# Patient Record
Sex: FEMALE | Race: WHITE | NOT HISPANIC OR LATINO | URBAN - METROPOLITAN AREA
[De-identification: names, ages, dates, MRNs, and addresses within clinical notes are randomized per-mention and may not be internally consistent; named-entity substitution may affect disease eponyms.]

---

## 2020-12-31 ENCOUNTER — EMERGENCY (EMERGENCY)
Facility: HOSPITAL | Age: 20
LOS: 1 days | Discharge: HOME | End: 2020-12-31
Attending: EMERGENCY MEDICINE | Admitting: EMERGENCY MEDICINE
Payer: OTHER MISCELLANEOUS

## 2020-12-31 VITALS
HEIGHT: 68 IN | WEIGHT: 115.08 LBS | RESPIRATION RATE: 18 BRPM | HEART RATE: 78 BPM | DIASTOLIC BLOOD PRESSURE: 80 MMHG | TEMPERATURE: 98 F | SYSTOLIC BLOOD PRESSURE: 120 MMHG | OXYGEN SATURATION: 98 %
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
## 2020-12-31 DIAGNOSIS — S09.90XA UNSPECIFIED INJURY OF HEAD, INITIAL ENCOUNTER: ICD-10-CM
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
## 2020-12-31 PROCEDURE — 99284 EMERGENCY DEPT VISIT MOD MDM: CPT | Mod: 25

## 2020-12-31 PROCEDURE — 70450 CT HEAD/BRAIN W/O DYE: CPT | Mod: 26

## 2020-12-31 PROCEDURE — 99284 EMERGENCY DEPT VISIT MOD MDM: CPT

## 2020-12-31 PROCEDURE — 70450 CT HEAD/BRAIN W/O DYE: CPT

## 2020-12-31 RX ORDER — IBUPROFEN 200 MG
600 TABLET ORAL ONCE
Refills: 0 | Status: COMPLETED | OUTPATIENT
Start: 2020-12-31 | End: 2020-12-31

## 2020-12-31 RX ADMIN — Medication 600 MILLIGRAM(S): at 10:57

## 2020-12-31 NOTE — ED ADULT NURSE NOTE - OBJECTIVE STATEMENT
Pt presents reporting she struck her head on a microscope 1 hr pta. Pt reports hx of recent concussions, last in 9/2020. Pt reports frontal headache.

## 2020-12-31 NOTE — ED PROVIDER NOTE - CLINICAL SUMMARY MEDICAL DECISION MAKING FREE TEXT BOX
Impression: h/o head injury and post concussive syndrome, here w/ L sided head injury today w/ no associated LOC. Pt afebrile and HDS. Not on any blood thinners. Neuro exam is nonfocal. Plan for CT head to r/o acute intracranial bleeding; if negative will treat w/ toradol. Impression: h/o head injury and post concussive syndrome, here w/ L sided head injury today w/ no associated LOC. Pt afebrile and HDS. Not on any blood thinners. Neuro exam is nonfocal. Plan for CT head to r/o acute intracranial bleeding; if negative will treat w/ toradol.    CT head neg for acute injury; ? vascular malformation/ underlying mass to r frontal white matter. ED evaluation and management discussed with the patient in detail.  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 Close neuro and PMD follow up encouraged.  Strict ED return instructions discussed in detail and patient given the opportunity to ask any questions about their discharge diagnosis and instructions. Patient verbalized understanding.

## 2020-12-31 NOTE — ED ADULT NURSE NOTE - OTHER COMPLAINTS
biba from [facility] c/o headache, dizziness and nausea s/p hitting left side head on microscope.   No loc, no blood thinners.

## 2020-12-31 NOTE — ED PROVIDER NOTE - CHPI ED SYMPTOMS NEG
no vision changes, no speech difficulties, no neck pain, no back pain/no loss of consciousness/no vomiting/no weakness

## 2020-12-31 NOTE — ED PROVIDER NOTE - OBJECTIVE STATEMENT
21 y/o F with PMHx prior head injury and post concussive syndrome, most recently in 9/2020, presents to the ED s/p head injury sustained today. 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 Denies LOC. States the onset of dizziness started approximately 20 minutes later, described as feeling unsteady on her feet, w/ associated nausea but no vomiting. The nausea has since resolved, however the dizziness remains, as well as a 6/10 frontal headache and some tingling to her L hand. Pt otherwise denies any other symptoms; denies any acute vision changes, speech difficulties, focal numbness or weakness, neck or back pain. States she was in her usual state of health prior to today. 21 y/o F with PMHx prior head injury and post concussive syndrome, most recently in 9/2020, presents to the ED s/p head injury sustained today. 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 Denies LOC. States the onset of dizziness started approximately 20 minutes later, described as feeling unsteady on her feet, w/ associated nausea but no vomiting. The nausea has since resolved, however the dizziness remains, as well as a 6/10 frontal headache and some tingling to her L hand. Pt otherwise denies any other symptoms; denies any acute vision changes, speech difficulties, focal numbness or weakness, neck or back pain. States she was in her usual state of health prior to today. Not on blood thinners.

## 2020-12-31 NOTE — ED ADULT TRIAGE NOTE - OTHER COMPLAINTS
biba from [facility] c/o headache, dizziness and nausea s/p hitting left side head on microscope.   No loc, no blood thinners.

## 2020-12-31 NOTE — ED PROVIDER NOTE - PHYSICAL EXAMINATION
VITAL SIGNS: I have reviewed nursing notes and confirm.  CONSTITUTIONAL: Well-developed; well-nourished; in no acute distress.   SKIN:  warm and dry, no acute rash.   HEAD:  normocephalic, atraumatic.  EYES: PERRL, EOM intact; conjunctiva and sclera clear.  ENT: No nasal discharge; airway clear.   NECK: Supple; non tender.  CARD: S1, S2 normal; no murmurs, gallops, or rubs. Regular rate and rhythm.   RESP:  Clear to auscultation b/l, no wheezes, rales or rhonchi.  ABD: Normal bowel sounds; soft; non-distended; non-tender; no guarding/ rebound.  EXT: Normal ROM. No clubbing, cyanosis or edema. 2+ pulses to b/l ue/le.  NEURO: Alert & oriented x3, CN II-XII intact, motor function and sensation intact and equal b/l, negative pronator drift, finger to nose, heel to shin, and rapid alternating movements intact, normal gait, no ataxia.  PSYCH: Cooperative, mood and affect appropriate.

## 2020-12-31 NOTE — ED PROVIDER NOTE - NSFOLLOWUPINSTRUCTIONS_ED_ALL_ED_FT
Take ibuprofen as needed for headache.   Follow up with your neurologist for re-evaluation and further work up as indicated.  Return to er for any new or worsening symptoms (severe headache, acute vision changes, focal numbness, weakness, nausea, vomiting...)                 Log Out.      [email or web address] CareNotes®     :  [facility]  	                       HEAD INJURY - AfterCare(R) Instructions(ER/ED)           Head Injury    WHAT YOU NEED TO KNOW:    A head injury can include your scalp, face, skull, or brain and range from mild to severe. Effects can appear immediately after the injury or develop later. The effects may last a short time or be permanent. Healthcare providers may want to check your recovery over time. Treatment may change as you recover or develop new health problems from the head injury.    DISCHARGE INSTRUCTIONS:    Call your local emergency number (911 in the [place]), or have someone else call if:   •You cannot be woken.      •You have a seizure.      •You stop responding to others or you faint.      •You have blurry or double vision.      •Your speech becomes slurred or confused.      •You have arm or leg weakness, loss of feeling, or new problems with coordination.      •Your pupils are larger than usual, or one pupil is a different size than the other.      •You have blood or clear fluid coming out of your ears or nose.      Return to the emergency department if:   •You have repeated or forceful vomiting.      •You feel confused.      •Your headache gets worse or becomes severe.      •You or someone caring for you notices that you are harder to wake than usual.      Call your doctor if:   •Your symptoms last longer than 6 weeks after the injury.      •You have questions or concerns about your condition or care.      Medicines:   •Acetaminophen decreases pain and fever. It is available without a doctor's order. Ask how much to take and how often to take it. Follow directions. Read the labels of all other medicines you are using to see if they also contain acetaminophen, or ask your doctor or pharmacist. Acetaminophen can cause liver damage if not taken correctly. Do not use more than 4 grams (4,000 milligrams) total of acetaminophen in one day.       •Take your medicine as directed. Contact your healthcare provider if you think your medicine is not helping or if you have side effects. Tell him or her if you are allergic to any medicine. Keep a list of the medicines, vitamins, and herbs you take. Include the amounts, and when and why you take them. Bring the list or the pill bottles to follow-up visits. Carry your medicine list with you in case of an emergency.      Self-care:   •Rest or do quiet activities. Limit your time watching TV, using the computer, or doing tasks that require a lot of thinking. Slowly return to your normal activities as directed. Do not play sports or do activities that may cause you to get hit in the head. Ask your healthcare provider when you can return to sports.      •Apply ice on your head for 15 to 20 minutes every hour or as directed. Use an ice pack, or put crushed ice in a plastic bag. Cover it with a towel before you apply it to your skin. Ice helps prevent tissue damage and decreases swelling and pain.      •Have someone stay with you for 24 hours , or as directed. This person can monitor you for problems and call for help if needed. When you are awake, the person should ask you a few questions every few hours to see if you are thinking clearly. An example is to ask your name or address.      Prevent another head injury:   •Wear a helmet that fits properly. Do this when you play sports, or ride a bike, scooter, or skateboard. Helmets help decrease your risk for a serious head injury. Talk to your healthcare provider about other ways you can protect yourself if you play sports.      •Wear your seatbelt every time you are in a car. This helps lower your risk for a head injury if you are in a car accident.      Follow up with your doctor as directed: Write down your questions so you remember to ask them during your visits.       © Copyright [employer] 2020           back to top                          © Copyright [employer] 2020

## 2020-12-31 NOTE — ED ADULT NURSE NOTE - NSIMPLEMENTINTERV_GEN_ALL_ED
Implemented All Universal Safety Interventions:  [place] to call system. Call bell, personal items and telephone within reach. Instruct patient to call for assistance. Room bathroom lighting operational. Non-slip footwear when patient is off stretcher. Physically safe environment: no spills, clutter or unnecessary equipment. Stretcher in lowest position, wheels locked, appropriate side rails in place.

## 2020-12-31 NOTE — ED PROVIDER NOTE - PATIENT PORTAL LINK FT
You can access the FollowMyHealth Patient Portal offered by [facility] by registering at the following website: [email or web address]. By joining [employer]’s FollowMyHealth portal, you will also be able to view your health information using other applications (apps) compatible with our system.

## 2021-01-21 ENCOUNTER — EMERGENCY (EMERGENCY)
Facility: HOSPITAL | Age: 21
LOS: 1 days | Discharge: HOME | End: 2021-01-21
Admitting: EMERGENCY MEDICINE
Payer: COMMERCIAL

## 2021-01-21 VITALS
HEIGHT: 68 IN | HEART RATE: 85 BPM | OXYGEN SATURATION: 98 % | DIASTOLIC BLOOD PRESSURE: 86 MMHG | TEMPERATURE: 99 F | SYSTOLIC BLOOD PRESSURE: 124 MMHG | RESPIRATION RATE: 16 BRPM

## 2021-01-21 DIAGNOSIS — Z20.822 CONTACT WITH AND (SUSPECTED) EXPOSURE TO COVID-19: ICD-10-CM

## 2021-01-21 PROCEDURE — 99283 EMERGENCY DEPT VISIT LOW MDM: CPT

## 2021-01-21 PROCEDURE — U0003: CPT

## 2021-01-21 PROCEDURE — U0005: CPT

## 2021-01-21 NOTE — ED PROVIDER NOTE - CLINICAL SUMMARY MEDICAL DECISION MAKING FREE TEXT BOX
Patient is presenting to the Emergency Department and requesting a COVID-19 test.  Patient has minimal symptoms and is hemodynamically stable, has an unremarkable focused exam and looks well.  Nasopharyngeal PCR has been obtained and patient has been guided on how to obtain their results.  General COVID-19 discharge instructions have been given to the patient.

## 2021-01-21 NOTE — ED PROVIDER NOTE - NS ED ROS FT
CONSTITUTIONAL:  No fever or chills, +bodyaches  HEENT:  +ore throat or headache, no nasal congestion  PULM:  No cough or shortness of breath  GI:  +diarrhea, no vomiting

## 2021-01-21 NOTE — ED PROVIDER NOTE - OBJECTIVE STATEMENT
Patient is presenting to the Emergency Department with a request to have COVID-19 testing. She reports diarrhea, chest tightness, body aches, and sore throat since yesterday.  Denies symptoms, including cough, fever, chills, vomiting.

## 2021-01-21 NOTE — ED PROVIDER NOTE - PATIENT PORTAL LINK FT
You can access the FollowMyHealth Patient Portal offered by [facility] by registering at the following website: [email or web address]. By joining [employer]’s FollowMyHealth portal, you will also be able to view your health information using other applications (apps) compatible with our system.

## 2021-01-22 PROBLEM — F07.81 POSTCONCUSSIONAL SYNDROME: Chronic | Status: ACTIVE | Noted: 2020-12-31

## 2021-01-22 LAB — SARS-COV-2 RNA SPEC QL NAA+PROBE: DETECTED

## 2021-02-25 ENCOUNTER — EMERGENCY (EMERGENCY)
Facility: HOSPITAL | Age: 21
LOS: 1 days | Discharge: HOME | End: 2021-02-25
Attending: EMERGENCY MEDICINE | Admitting: EMERGENCY MEDICINE
Payer: COMMERCIAL

## 2021-02-25 VITALS
OXYGEN SATURATION: 98 % | DIASTOLIC BLOOD PRESSURE: 75 MMHG | SYSTOLIC BLOOD PRESSURE: 120 MMHG | TEMPERATURE: 98 F | HEART RATE: 75 BPM | RESPIRATION RATE: 16 BRPM

## 2021-02-25 VITALS
WEIGHT: 113.1 LBS | TEMPERATURE: 99 F | OXYGEN SATURATION: 100 % | SYSTOLIC BLOOD PRESSURE: 119 MMHG | DIASTOLIC BLOOD PRESSURE: 77 MMHG | RESPIRATION RATE: 18 BRPM | HEART RATE: 78 BPM | HEIGHT: 68 IN

## 2021-02-25 DIAGNOSIS — F07.81 POSTCONCUSSIONAL SYNDROME: ICD-10-CM

## 2021-02-25 DIAGNOSIS — R51.9 HEADACHE, UNSPECIFIED: ICD-10-CM

## 2021-02-25 LAB
ALBUMIN SERPL ELPH-MCNC: 4.5 G/DL — SIGNIFICANT CHANGE UP (ref 3.3–5)
ALP SERPL-CCNC: 50 U/L — SIGNIFICANT CHANGE UP (ref 40–120)
ALT FLD-CCNC: 8 U/L — LOW (ref 10–45)
ANION GAP SERPL CALC-SCNC: 10 MMOL/L — SIGNIFICANT CHANGE UP (ref 5–17)
AST SERPL-CCNC: 16 U/L — SIGNIFICANT CHANGE UP (ref 10–40)
BASOPHILS # BLD AUTO: 0.05 K/UL — SIGNIFICANT CHANGE UP (ref 0–0.2)
BASOPHILS NFR BLD AUTO: 0.8 % — SIGNIFICANT CHANGE UP (ref 0–2)
BILIRUB SERPL-MCNC: 0.2 MG/DL — SIGNIFICANT CHANGE UP (ref 0.2–1.2)
BUN SERPL-MCNC: 12 MG/DL — SIGNIFICANT CHANGE UP (ref 7–23)
CALCIUM SERPL-MCNC: 9.2 MG/DL — SIGNIFICANT CHANGE UP (ref 8.4–10.5)
CHLORIDE SERPL-SCNC: 105 MMOL/L — SIGNIFICANT CHANGE UP (ref 96–108)
CO2 SERPL-SCNC: 26 MMOL/L — SIGNIFICANT CHANGE UP (ref 22–31)
CREAT SERPL-MCNC: 0.61 MG/DL — SIGNIFICANT CHANGE UP (ref 0.5–1.3)
EOSINOPHIL # BLD AUTO: 0.08 K/UL — SIGNIFICANT CHANGE UP (ref 0–0.5)
EOSINOPHIL NFR BLD AUTO: 1.3 % — SIGNIFICANT CHANGE UP (ref 0–6)
GLUCOSE SERPL-MCNC: 92 MG/DL — SIGNIFICANT CHANGE UP (ref 70–99)
HCG SERPL-ACNC: <0 MIU/ML — SIGNIFICANT CHANGE UP
HCT VFR BLD CALC: 37.5 % — SIGNIFICANT CHANGE UP (ref 34.5–45)
HGB BLD-MCNC: 12.9 G/DL — SIGNIFICANT CHANGE UP (ref 11.5–15.5)
IMM GRANULOCYTES NFR BLD AUTO: 0.3 % — SIGNIFICANT CHANGE UP (ref 0–1.5)
LYMPHOCYTES # BLD AUTO: 2.72 K/UL — SIGNIFICANT CHANGE UP (ref 1–3.3)
LYMPHOCYTES # BLD AUTO: 45.4 % — HIGH (ref 13–44)
MCHC RBC-ENTMCNC: 30.1 PG — SIGNIFICANT CHANGE UP (ref 27–34)
MCHC RBC-ENTMCNC: 34.4 GM/DL — SIGNIFICANT CHANGE UP (ref 32–36)
MCV RBC AUTO: 87.4 FL — SIGNIFICANT CHANGE UP (ref 80–100)
MONOCYTES # BLD AUTO: 0.47 K/UL — SIGNIFICANT CHANGE UP (ref 0–0.9)
MONOCYTES NFR BLD AUTO: 7.8 % — SIGNIFICANT CHANGE UP (ref 2–14)
NEUTROPHILS # BLD AUTO: 2.65 K/UL — SIGNIFICANT CHANGE UP (ref 1.8–7.4)
NEUTROPHILS NFR BLD AUTO: 44.4 % — SIGNIFICANT CHANGE UP (ref 43–77)
NRBC # BLD: 0 /100 WBCS — SIGNIFICANT CHANGE UP (ref 0–0)
PLATELET # BLD AUTO: 189 K/UL — SIGNIFICANT CHANGE UP (ref 150–400)
POTASSIUM SERPL-MCNC: 3.9 MMOL/L — SIGNIFICANT CHANGE UP (ref 3.5–5.3)
POTASSIUM SERPL-SCNC: 3.9 MMOL/L — SIGNIFICANT CHANGE UP (ref 3.5–5.3)
PROT SERPL-MCNC: 7.2 G/DL — SIGNIFICANT CHANGE UP (ref 6–8.3)
RBC # BLD: 4.29 M/UL — SIGNIFICANT CHANGE UP (ref 3.8–5.2)
RBC # FLD: 11.7 % — SIGNIFICANT CHANGE UP (ref 10.3–14.5)
SODIUM SERPL-SCNC: 141 MMOL/L — SIGNIFICANT CHANGE UP (ref 135–145)
WBC # BLD: 5.99 K/UL — SIGNIFICANT CHANGE UP (ref 3.8–10.5)
WBC # FLD AUTO: 5.99 K/UL — SIGNIFICANT CHANGE UP (ref 3.8–10.5)

## 2021-02-25 PROCEDURE — 96374 THER/PROPH/DIAG INJ IV PUSH: CPT

## 2021-02-25 PROCEDURE — 99285 EMERGENCY DEPT VISIT HI MDM: CPT

## 2021-02-25 PROCEDURE — 80053 COMPREHEN METABOLIC PANEL: CPT

## 2021-02-25 PROCEDURE — 84702 CHORIONIC GONADOTROPIN TEST: CPT

## 2021-02-25 PROCEDURE — 99284 EMERGENCY DEPT VISIT MOD MDM: CPT | Mod: 25

## 2021-02-25 PROCEDURE — 82962 GLUCOSE BLOOD TEST: CPT

## 2021-02-25 PROCEDURE — 36415 COLL VENOUS BLD VENIPUNCTURE: CPT

## 2021-02-25 PROCEDURE — 85025 COMPLETE CBC W/AUTO DIFF WBC: CPT

## 2021-02-25 PROCEDURE — 96375 TX/PRO/DX INJ NEW DRUG ADDON: CPT

## 2021-02-25 PROCEDURE — 70450 CT HEAD/BRAIN W/O DYE: CPT

## 2021-02-25 PROCEDURE — 70450 CT HEAD/BRAIN W/O DYE: CPT | Mod: 26,MA

## 2021-02-25 RX ORDER — SODIUM CHLORIDE 9 MG/ML
1000 INJECTION INTRAMUSCULAR; INTRAVENOUS; SUBCUTANEOUS ONCE
Refills: 0 | Status: COMPLETED | OUTPATIENT
Start: 2021-02-25 | End: 2021-02-25

## 2021-02-25 RX ORDER — METOCLOPRAMIDE HCL 10 MG
10 TABLET ORAL ONCE
Refills: 0 | Status: COMPLETED | OUTPATIENT
Start: 2021-02-25 | End: 2021-02-25

## 2021-02-25 RX ORDER — KETOROLAC TROMETHAMINE 30 MG/ML
30 SYRINGE (ML) INJECTION ONCE
Refills: 0 | Status: DISCONTINUED | OUTPATIENT
Start: 2021-02-25 | End: 2021-02-25

## 2021-02-25 RX ADMIN — Medication 30 MILLIGRAM(S): at 17:27

## 2021-02-25 RX ADMIN — Medication 10 MILLIGRAM(S): at 17:26

## 2021-02-25 RX ADMIN — SODIUM CHLORIDE 1000 MILLILITER(S): 9 INJECTION INTRAMUSCULAR; INTRAVENOUS; SUBCUTANEOUS at 17:27

## 2021-02-25 NOTE — ED PROVIDER NOTE - PHYSICAL EXAMINATION
GEN: Well appearing, well nourished, awake, alert, oriented to person, place, time/situation and in no apparent distress. NTAF  ENT: Airway patent, Nasal mucosa clear. Mouth with normal mucosa.  EYES: Clear bilaterally. PERRL, EOMI  RESPIRATORY: Breathing comfortably with normal RR. No W/C/R, no hypoxia or resp distress.  CARDIAC: Regular rate and rhythm, no M/R/G  ABDOMEN: Soft, nontender, +bowel sounds, no rebound, rigidity, or guarding.  MSK: Range of motion is not limited, no deformities noted.  NEURO: Alert and oriented x 3. Cn 2-12 intact. Strength 5/5 and sensation intact in all 4 extremities.  Gait normal.   SKIN: Skin normal color for race, warm, dry and intact. No evidence of rash.  PSYCH: Alert and oriented to person, place, time/situation. normal mood and affect. no apparent risk to self or others.

## 2021-02-25 NOTE — ED PROVIDER NOTE - PATIENT PORTAL LINK FT
You can access the FollowMyHealth Patient Portal offered by [facility] by registering at the following website: [email or web address]. By joining [employer]’s FollowMyHealth portal, you will also be able to view your health information using other applications (apps) compatible with our system.

## 2021-02-25 NOTE — ED ADULT NURSE NOTE - OBJECTIVE STATEMENT
20 y.o F a&ox4 walked in from triage c.o headache. around 7 am today pt hit her head on a microscope while at work. Pt reports headache, blurred vision, nausea. PT states " I have post concussion syndrome this happened when I had a concussion last year." denies numbness, tingling, weakness. ambulates with steady gait. upgrade to MD szymanski. no blood thinners.

## 2021-02-25 NOTE — ED PROVIDER NOTE - NSFOLLOWUPINSTRUCTIONS_ED_ALL_ED_FT
Please follow up with your primary care physician and neurologist. If you have any problem getting an appointment this week, please call the ED Referral Coordinator at [phone].  Return to the Emergency Department if you have any new or worsening symptoms, or for any other concerns. Please read below for further information.      Post Concussion Syndrome    WHAT YOU NEED TO KNOW:    Post-concussion syndrome (PCS) is a group of symptoms that affect your nerves, thinking, and behavior. PCS develops shortly after a concussion and can last for weeks to months.    DISCHARGE INSTRUCTIONS:    Have someone call 911 for any of the following:   •You have a seizure.      •You have trouble breathing.      •You are not responding or you cannot be woken.      Return to the emergency department if:   •You have a sudden headache that seems different or much worse than your usual headaches.      •You cannot stop vomiting.      •You have sudden changes in your vision.      Contact your healthcare provider if:   •You have nausea or are vomiting.      •You have trouble concentrating.      •You have difficulty speaking or thinking.      •Your symptoms get worse.      •You have questions or concerns about your condition or care.      Medicines: You may need any of the following:   •Acetaminophen decreases pain and fever. It is available without a doctor's order. Ask how much to take and how often to take it. Follow directions. Read the labels of all other medicines you are using to see if they also contain acetaminophen, or ask your doctor or pharmacist. Acetaminophen can cause liver damage if not taken correctly. Do not use more than 4 grams (4,000 milligrams) total of acetaminophen in one day.       •NSAIDs help decrease swelling and pain or fever. This medicine is available with or without a doctor's order. NSAIDs can cause stomach bleeding or kidney problems in certain people. If you take blood thinner medicine, always ask your healthcare provider if NSAIDs are safe for you. Always read the medicine label and follow directions.      •Antidepressants may be given for depression or sleep problems.      •Migraine medicines may be given for migraine headaches.       Prevent PCS:   •Make your home safe. Home safety measures can help prevent head injuries that could lead to a concussion. Install handrails for every staircase. Put soft bumpers on furniture edges and corners. Secure furniture, such as dressers and book cases so they do not fall over.      •Always wear a seatbelt in the car. This helps decrease your risk for a head injury if you are in a car accident.      •Wear protective sports equipment that fits properly. Helmets help decrease your risk for a serious brain injury. Talk to your healthcare provider about other ways that you can decrease your risk for a concussion if you play sports. Ask for more information about sports concussions.      Manage your symptoms:   •Rest from physical and mental activities as directed. Mental activities need you to think, concentrate, and pay attention. Rest will help you recover from your concussion. Ask your healthcare provider when you can return to school and other daily activities.      •Go to therapy as directed. A cognitive behavioral therapist teaches you skills to help with any thinking and behavior problems you may have. An occupational therapist teaches your skills to help with daily activities.      •Do not participate in sports or physical activities until your healthcare provider says it is okay.These activities could make your symptoms worse or lead to another concussion. Your healthcare provider will tell you when it is okay to return to sports or physical activities.      Follow up with your healthcare provider as directed: Your healthcare provider may refer you to a psychiatrist, a neurologist, or a substance abuse counselor. Write down your questions so you remember to ask them during your visits.      Headache    A headache is pain or discomfort felt around the head or neck area. The specific cause of a headache may not be found as there are many types including tension headaches, migraine headaches, and cluster headaches. Watch your condition for any changes. Things you can do to manage your pain include taking over the counter and prescription medications as instructed by your health care provider, lying down in a dark quiet room, limiting stress, getting regular sleep, and refraining from alcohol and tobacco products.    SEEK IMMEDIATE MEDICAL CARE IF YOU HAVE ANY OF THE FOLLOWING SYMPTOMS: fever, vomiting, stiff neck, loss of vision, problems with speech, muscle weakness, loss of balance, trouble walking, passing out, or confusion.

## 2021-02-25 NOTE — ED ADULT TRIAGE NOTE - CHIEF COMPLAINT QUOTE
dizziness and blurry vision with headache s/p head strike at 7:15 AM; reports suffering currently from current post-concusive syndrome and suffers from chronic headaches; denies LOC; no blood thinners

## 2021-02-25 NOTE — ED PROVIDER NOTE - OBJECTIVE STATEMENT
20F with PMHx prior head injury in 9/2020, with resultant post concussive syndrome, who p/w headache she sustained head injury today at work. 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 She went home and took tylenol but still is not feeling better with a posterior and frontal headache, light sensitivity, nausea, feeling unsteady and blurry vision.  No f/c, no cp/sob, no n/v, no abd pain, no neck pain, no n/t/w in ext.    Not on blood thinners.

## 2021-02-25 NOTE — ED PROVIDER NOTE - CLINICAL SUMMARY MEDICAL DECISION MAKING FREE TEXT BOX
20F with PMHx prior head injury in 9/2020, with resultant post concussive syndrome, who p/w headache she sustained head injury today at work. 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 She went home and took tylenol but still is not feeling better with a posterior and frontal headache, light sensitivity, nausea, feeling unsteady and blurry vision.  No f/c, no cp/sob, no n/v, no abd pain, no neck pain, no n/t/w in ext.    Not on blood thinners.    Pt well-appearing without focal neuro deficit, VSS. EKG nsr with sa, plan for labs, CT head to r/o ICH, meds for headache. If imaging negative and patient feels better anticipate DC with outpt neuro follow up for continued management of her post concussion syndrome.

## 2021-03-24 PROBLEM — Z00.00 ENCOUNTER FOR PREVENTIVE HEALTH EXAMINATION: Status: ACTIVE | Noted: 2021-03-24

## 2021-03-25 ENCOUNTER — APPOINTMENT (OUTPATIENT)
Dept: OTOLARYNGOLOGY | Facility: CLINIC | Age: 21
End: 2021-03-25
Payer: COMMERCIAL

## 2021-03-25 VITALS
OXYGEN SATURATION: 95 % | TEMPERATURE: 97.3 F | SYSTOLIC BLOOD PRESSURE: 136 MMHG | HEIGHT: 65 IN | DIASTOLIC BLOOD PRESSURE: 65 MMHG | BODY MASS INDEX: 19.16 KG/M2 | HEART RATE: 86 BPM | WEIGHT: 115 LBS

## 2021-03-25 DIAGNOSIS — E04.1 NONTOXIC SINGLE THYROID NODULE: ICD-10-CM

## 2021-03-25 PROCEDURE — 99072 ADDL SUPL MATRL&STAF TM PHE: CPT

## 2021-03-25 PROCEDURE — 92557 COMPREHENSIVE HEARING TEST: CPT

## 2021-03-25 PROCEDURE — 92550 TYMPANOMETRY & REFLEX THRESH: CPT

## 2021-03-25 PROCEDURE — 31231 NASAL ENDOSCOPY DX: CPT

## 2021-03-25 PROCEDURE — 99203 OFFICE O/P NEW LOW 30 MIN: CPT | Mod: 25

## 2021-03-25 NOTE — HISTORY OF PRESENT ILLNESS
[de-identified] : Nasal obstruction, hearing loss sp head injury\par Mild tinnitus, hx of ear infections

## 2021-03-25 NOTE — REASON FOR VISIT
[Initial Evaluation] : an initial evaluation for [FreeTextEntry2] : Nasal obstruction, hearing loss sp head injury

## 2021-03-25 NOTE — PHYSICAL EXAM
[de-identified] : Thyroid nodule Lt side [de-identified] : TMJ click [] : septum deviated bilaterally [Normal] : mucosa is normal [Midline] : trachea located in midline position

## 2021-03-29 PROBLEM — E04.1 THYROID NODULE: Status: ACTIVE | Noted: 2021-03-29

## 2021-04-08 ENCOUNTER — OUTPATIENT (OUTPATIENT)
Dept: OUTPATIENT SERVICES | Facility: HOSPITAL | Age: 21
LOS: 1 days | End: 2021-04-08

## 2021-04-08 ENCOUNTER — APPOINTMENT (OUTPATIENT)
Dept: ULTRASOUND IMAGING | Facility: CLINIC | Age: 21
End: 2021-04-08
Payer: COMMERCIAL

## 2021-04-08 ENCOUNTER — RESULT REVIEW (OUTPATIENT)
Age: 21
End: 2021-04-08

## 2021-04-08 ENCOUNTER — TRANSCRIPTION ENCOUNTER (OUTPATIENT)
Age: 21
End: 2021-04-08

## 2021-04-08 PROCEDURE — 76536 US EXAM OF HEAD AND NECK: CPT | Mod: 26

## 2021-04-21 ENCOUNTER — APPOINTMENT (OUTPATIENT)
Dept: OTOLARYNGOLOGY | Facility: CLINIC | Age: 21
End: 2021-04-21
Payer: COMMERCIAL

## 2021-04-21 VITALS
OXYGEN SATURATION: 100 % | TEMPERATURE: 98.2 F | DIASTOLIC BLOOD PRESSURE: 82 MMHG | BODY MASS INDEX: 18.99 KG/M2 | HEIGHT: 65 IN | WEIGHT: 114 LBS | SYSTOLIC BLOOD PRESSURE: 125 MMHG | RESPIRATION RATE: 14 BRPM | HEART RATE: 60 BPM

## 2021-04-21 DIAGNOSIS — Z83.3 FAMILY HISTORY OF DIABETES MELLITUS: ICD-10-CM

## 2021-04-21 DIAGNOSIS — F07.81 POSTCONCUSSIONAL SYNDROME: ICD-10-CM

## 2021-04-21 DIAGNOSIS — Z82.49 FAMILY HISTORY OF ISCHEMIC HEART DISEASE AND OTHER DISEASES OF THE CIRCULATORY SYSTEM: ICD-10-CM

## 2021-04-21 DIAGNOSIS — Z81.1 FAMILY HISTORY OF ALCOHOL ABUSE AND DEPENDENCE: ICD-10-CM

## 2021-04-21 DIAGNOSIS — Z78.9 OTHER SPECIFIED HEALTH STATUS: ICD-10-CM

## 2021-04-21 DIAGNOSIS — E04.2 NONTOXIC MULTINODULAR GOITER: ICD-10-CM

## 2021-04-21 DIAGNOSIS — D44.0 NEOPLASM OF UNCERTAIN BEHAVIOR OF THYROID GLAND: ICD-10-CM

## 2021-04-21 DIAGNOSIS — J34.2 DEVIATED NASAL SEPTUM: ICD-10-CM

## 2021-04-21 DIAGNOSIS — Z83.49 FAMILY HISTORY OF OTHER ENDOCRINE, NUTRITIONAL AND METABOLIC DISEASES: ICD-10-CM

## 2021-04-21 DIAGNOSIS — K21.9 GASTRO-ESOPHAGEAL REFLUX DISEASE W/OUT ESOPHAGITIS: ICD-10-CM

## 2021-04-21 PROCEDURE — 99072 ADDL SUPL MATRL&STAF TM PHE: CPT

## 2021-04-21 PROCEDURE — 99215 OFFICE O/P EST HI 40 MIN: CPT | Mod: 25

## 2021-04-21 PROCEDURE — 76536 US EXAM OF HEAD AND NECK: CPT

## 2021-04-21 PROCEDURE — 31575 DIAGNOSTIC LARYNGOSCOPY: CPT

## 2021-04-21 PROCEDURE — 10005 FNA BX W/US GDN 1ST LES: CPT

## 2021-04-21 NOTE — CONSULT LETTER
[Dear  ___] : Dear  [unfilled], [Consult Letter:] : I had the pleasure of evaluating your patient, [unfilled]. [Please see my note below.] : Please see my note below. [Consult Closing:] : Thank you very much for allowing me to participate in the care of this patient.  If you have any questions, please do not hesitate to contact me. [Sincerely,] : Sincerely, [DrMaynor  ___] : Dr. TSANG [FreeTextEntry3] : \par Darshan Wahl M.D., FACS, ECNU\par Director [facility] Thyroid [facility] Parathyroid Surgery\par [facility]\par Certified in Thyroid/Parathyroid/Neck Ultrasound, ECNU/ AIUM\par \par , Department of Otolaryngology\par [facility] [employer] [facility]\par

## 2021-04-21 NOTE — REASON FOR VISIT
[FreeTextEntry2] : surgical consultation for a 1.9 cm right lower pole thyroid nodule suspicious for carcinoma.  [FreeTextEntry1] : Referred by Dr. Marquez for a new right thyroid nodule, PCP [facility] in [place].

## 2021-04-21 NOTE — PROCEDURE
[Image(s) Captured] : image(s) captured and filed [Unable to Cooperate with Mirror] : patient unable to cooperate with mirror [Gag Reflex] : gag reflex preventing mirror examination [Topical Lidocaine] : topical lidocaine [Oxymetazoline HCl] : oxymetazoline HCl [Flexible Endoscope] : examined with the flexible endoscope [Serial Number: ___] : Serial Number: [unfilled] [FreeTextEntry3] : \par [facility]\par THYROID/NECK ULTRASOUND REPORT\par \par NAME: KIN ALEXANDER .........MR# [id number] .........: 2000 .........DATE: 2021 ........\par \par HISTORY/ INDICATIONS:  A 20- year-old female with a solid right mid to lower lobe nodule suspicious for malignancy.  \par \par COMPARISON: None\par \par PROCEDURE: Physician performed high-resolution ultrasound gray scale imaging and color Doppler supplementation of the thyroid gland and neck was obtained in the longitudinal and transverse planes using a 13 MHz linear transducer with image capture.  All measurements are in centimeters (longitudinal x AP x transverse).  \par \par FINDINGS: Overall the thyroid gland is normal in size, heterogeneous in echotexture with normal vascularity on color Doppler flow. \par \par RIGHT LOBE: Is not enlarged, heterogeneous, with normal vascularity on color Doppler and measures 3.49 x 1.74 x 1.98 cm.  NODULES: Within the right mid to lower lobe, a hypoechoic, heterogeneous, solid nodule has numerous punctate echogenic foci consistent with microcalcifications, lobulated/ infiltrative margins, grade 3 vascularity, is wider than tall and measures 1.85 x 0.92 x 1.42 cm. This nodule is highly suspicious for papillary carcinoma, ACR TR-5.\par \par ISTHMUS: Measures 0.22 cm in AP dimension and is heterogeneous in echotexture with normal vascularity.  No nodules are identified.\par \par LEFT LOBE: Is not enlarged, heterogeneous, with normal vascularity on color Doppler and measures 4.43 x 1.11 x 1.58 cm. NODULES: None identified.\par \par PARATHYROID GLANDS: There are no identified enlarged parathyroid glands in the central neck compartment. \par \par LYMPH NODES: There are several benign appearing subcentimeter lymph nodes identified at neck levels II- III bilaterally (lateral neck), all with echogenic hilar lines, no calcifications or cystic degeneration and have a short long axis ratio < 0.5 in the transverse plane.  There are no enlarged or abnormal appearing central compartment, level VI lymph nodes.\par \par IMPRESSION: A 20-year-old female with a solid hypoechoic nodule with microcalcifications and irregular borders is highly suspicious for papillary thyroid carcinoma.  The left thyroid lobe has no identified nodules.\par \par RECOMMENDATIONS: FNA biopsy is highly suggested to rule out malignancy.\par \par Electronically signed by Johanna Wahl MD on 2021, 2:40 PM\par \par [facility]: [address] [place] [address], [address], [place]  [address]\par [phone] (voice), [phone] (fax)\par \par \par ..........................................[facility]\par ........................................ULTRASOUND-GUIDED THYROID FINE NEEDLE ASPIRATION BIOPSY REPORT\par \par NAME: KIN ALEXANDER .........MR# [id number] .........: 2000 .........DATE: 2021 .........TIME: 2:45 PM\par \par HISTORY/ INDICATIONS: 20- year-old female with a solid right mid to lower lobe nodule suspicious for malignancy.\par \par EXAM: Real-time high-resolution ultrasound imaging of the thyroid gland was performed in the longitudinal and transverse planes with color power Doppler supplementation and image capture.\par \par FINDINGS: A right mid to lower lobe nodule measuring 1.85 x 0.92 x 1.42 cm (longitudinal x AP x transverse) was identified and targeted for USG-FNA.  The nodule had the following ultrasonographic characteristics: solid, hypoechoic, heterogeneous, lobulated infiltrative margins, multiple punctate echogenic foci, grade 3 vascularity on color Doppler, and wider-than-tall.\par \par PROCEDURE: A time out took place and documented for correct patient identifiers, site and side of procedure.  After obtaining informed consent with discussion of risks, benefits and alternatives, the patient was positioned semi-supine, the skin was prepped with alcohol and 0.5 cc of 1% Lidocaine with 1:100,000 epinephrine was injected for local anesthesia. A #25-gauge needle was then passed along the perpendicular plane of the transducer, positioned within the nodule and confirmed with ultrasonography.  Multiple aspirations were made within the nodule with two separate needle punctures, four passes each.  Aspirates were directly placed into both cytolyt and RNA protect media solutions for cytologic analysis, immunohistochemistry, and possible molecular genomic diagnostics.  The patient tolerated the procedure well without complications and was discharged with signed post-procedure instructions indicating the importance of following up on all results. \par \par ASSESSMENT & PLAN: Successful USG-FNA of a right mid to lower lobe nodule was well tolerated without complications. The patient will be contacted to review the cytologic findings as soon as available for further treatment planning.  A discussion took place with the patient who accepted the responsibility to call the office to review the cytology results if no communication occurs within two weeks. Follow-up imaging in 1 year is recommended if the cytology is reported as benign, [place] Category II.\par \par Electronically signed by JOHANNA WAHL M.D., FACS on 2021, 2:55 PM.\par \par [facility]: [address], [address],  [place] [address]\par [phone] (voice), [phone] (fax) [de-identified] : The nasal septum is minimally deviated to the left with a spur. There are no masses or polyps and the nasal mucosa and secretions are normal. The choanae and posterior nasopharynx are normal without masses or drainage. The Eustachian tube orifices appear patent. The pharynx, including the posterior and lateral pharyngeal walls, the vallecula and base of tongue are normal without ulcerations, lesions or masses. The hypopharynx including the pyriform sinuses open well without pooling of secretions, mucosal lesions or masses. The supraglottic larynx including the epiglottis, petiole, arytenoids, glossoepiglottic, aryepiglottic and pharyngoepiglottic folds are normal without mucosal lesions, ulcerations or masses. The glottis reveals normal false vocal folds. The true vocal folds are glistening white, tense and of equal length, without paralysis, having symmetric mobility on adduction and abduction. There are no mucosal lesions, nodules, cysts, erythroplasia or leukoplakia. The posterior cricoid area has healthy pink mucosa in the interarytenoid area and esophageal inlet. There is minimal thickening/edema of the interarytenoid mucosa suggestive of posterior laryngitis from laryngopharyngeal acid reflux disease. The trachea is clear without narrowing in the immediate subglottic region, without deviation or lesions.  [de-identified] : thyroid neoplasm
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
## 2021-05-06 ENCOUNTER — APPOINTMENT (OUTPATIENT)
Dept: OTOLARYNGOLOGY | Facility: CLINIC | Age: 21
End: 2021-05-06

## 2022-09-06 NOTE — ED PROVIDER NOTE - MUSCULOSKELETAL [+], MLM
tingling to L hand

## 2023-02-15 ENCOUNTER — APPOINTMENT (OUTPATIENT)
Dept: MRI IMAGING | Facility: HOSPITAL | Age: 23
End: 2023-02-15

## 2023-02-15 ENCOUNTER — APPOINTMENT (OUTPATIENT)
Dept: MRI IMAGING | Facility: CLINIC | Age: 23
End: 2023-02-15

## 2023-02-15 ENCOUNTER — OUTPATIENT (OUTPATIENT)
Dept: OUTPATIENT SERVICES | Facility: HOSPITAL | Age: 23
LOS: 1 days | End: 2023-02-15
Payer: COMMERCIAL

## 2023-02-15 PROCEDURE — A9585: CPT

## 2023-02-15 PROCEDURE — 70543 MRI ORBT/FAC/NCK W/O &W/DYE: CPT

## 2023-02-15 PROCEDURE — 70543 MRI ORBT/FAC/NCK W/O &W/DYE: CPT | Mod: 26

## 2023-05-26 ENCOUNTER — APPOINTMENT (OUTPATIENT)
Dept: MRI IMAGING | Facility: CLINIC | Age: 23
End: 2023-05-26
Payer: COMMERCIAL

## 2023-05-26 ENCOUNTER — OUTPATIENT (OUTPATIENT)
Dept: OUTPATIENT SERVICES | Facility: HOSPITAL | Age: 23
LOS: 1 days | End: 2023-05-26

## 2023-05-26 PROCEDURE — 70553 MRI BRAIN STEM W/O & W/DYE: CPT | Mod: 26

## 2023-07-14 ENCOUNTER — OUTPATIENT (OUTPATIENT)
Dept: OUTPATIENT SERVICES | Facility: HOSPITAL | Age: 23
LOS: 1 days | End: 2023-07-14

## 2023-07-14 ENCOUNTER — APPOINTMENT (OUTPATIENT)
Dept: ULTRASOUND IMAGING | Facility: CLINIC | Age: 23
End: 2023-07-14
Payer: COMMERCIAL

## 2023-07-14 PROCEDURE — 76536 US EXAM OF HEAD AND NECK: CPT | Mod: 26

## 2024-02-06 ENCOUNTER — EMERGENCY (EMERGENCY)
Facility: HOSPITAL | Age: 24
LOS: 1 days | Discharge: HOME | End: 2024-02-06
Attending: EMERGENCY MEDICINE | Admitting: EMERGENCY MEDICINE
Payer: COMMERCIAL

## 2024-02-06 VITALS
RESPIRATION RATE: 16 BRPM | DIASTOLIC BLOOD PRESSURE: 59 MMHG | HEART RATE: 51 BPM | TEMPERATURE: 98 F | SYSTOLIC BLOOD PRESSURE: 97 MMHG

## 2024-02-06 VITALS
HEART RATE: 45 BPM | OXYGEN SATURATION: 100 % | DIASTOLIC BLOOD PRESSURE: 73 MMHG | SYSTOLIC BLOOD PRESSURE: 113 MMHG | TEMPERATURE: 98 F | RESPIRATION RATE: 18 BRPM

## 2024-02-06 DIAGNOSIS — R07.89 OTHER CHEST PAIN: ICD-10-CM

## 2024-02-06 DIAGNOSIS — R63.0 ANOREXIA: ICD-10-CM

## 2024-02-06 DIAGNOSIS — R42 DIZZINESS AND GIDDINESS: ICD-10-CM

## 2024-02-06 DIAGNOSIS — N91.2 AMENORRHEA, UNSPECIFIED: ICD-10-CM

## 2024-02-06 DIAGNOSIS — Z82.49 FAMILY HISTORY OF ISCHEMIC HEART DISEASE AND OTHER DISEASES OF THE CIRCULATORY SYSTEM: ICD-10-CM

## 2024-02-06 DIAGNOSIS — R00.1 BRADYCARDIA, UNSPECIFIED: ICD-10-CM

## 2024-02-06 LAB
ALBUMIN SERPL ELPH-MCNC: 4.7 G/DL — SIGNIFICANT CHANGE UP (ref 3.3–5)
ALP SERPL-CCNC: 49 U/L — SIGNIFICANT CHANGE UP (ref 40–120)
ALT FLD-CCNC: 21 U/L — SIGNIFICANT CHANGE UP (ref 10–45)
ANION GAP SERPL CALC-SCNC: 10 MMOL/L — SIGNIFICANT CHANGE UP (ref 5–17)
APPEARANCE UR: CLEAR — SIGNIFICANT CHANGE UP
AST SERPL-CCNC: 20 U/L — SIGNIFICANT CHANGE UP (ref 10–40)
BACTERIA # UR AUTO: NEGATIVE /HPF — SIGNIFICANT CHANGE UP
BASOPHILS # BLD AUTO: 0.04 K/UL — SIGNIFICANT CHANGE UP (ref 0–0.2)
BASOPHILS NFR BLD AUTO: 1 % — SIGNIFICANT CHANGE UP (ref 0–2)
BILIRUB SERPL-MCNC: 0.4 MG/DL — SIGNIFICANT CHANGE UP (ref 0.2–1.2)
BILIRUB UR-MCNC: NEGATIVE — SIGNIFICANT CHANGE UP
BUN SERPL-MCNC: 15 MG/DL — SIGNIFICANT CHANGE UP (ref 7–23)
CALCIUM SERPL-MCNC: 9.8 MG/DL — SIGNIFICANT CHANGE UP (ref 8.4–10.5)
CHLORIDE SERPL-SCNC: 102 MMOL/L — SIGNIFICANT CHANGE UP (ref 96–108)
CK SERPL-CCNC: 201 U/L — HIGH (ref 25–170)
CO2 SERPL-SCNC: 27 MMOL/L — SIGNIFICANT CHANGE UP (ref 22–31)
COLOR SPEC: YELLOW — SIGNIFICANT CHANGE UP
CREAT SERPL-MCNC: 0.84 MG/DL — SIGNIFICANT CHANGE UP (ref 0.5–1.3)
D DIMER BLD IA.RAPID-MCNC: <150 NG/ML DDU — SIGNIFICANT CHANGE UP
DIFF PNL FLD: NEGATIVE — SIGNIFICANT CHANGE UP
EGFR: 99 ML/MIN/1.73M2 — SIGNIFICANT CHANGE UP
EOSINOPHIL # BLD AUTO: 0.04 K/UL — SIGNIFICANT CHANGE UP (ref 0–0.5)
EOSINOPHIL NFR BLD AUTO: 1 % — SIGNIFICANT CHANGE UP (ref 0–6)
GLUCOSE SERPL-MCNC: 88 MG/DL — SIGNIFICANT CHANGE UP (ref 70–99)
GLUCOSE UR QL: NEGATIVE MG/DL — SIGNIFICANT CHANGE UP
HCT VFR BLD CALC: 40 % — SIGNIFICANT CHANGE UP (ref 34.5–45)
HGB BLD-MCNC: 13.6 G/DL — SIGNIFICANT CHANGE UP (ref 11.5–15.5)
IMM GRANULOCYTES NFR BLD AUTO: 0 % — SIGNIFICANT CHANGE UP (ref 0–0.9)
KETONES UR-MCNC: ABNORMAL MG/DL
LEUKOCYTE ESTERASE UR-ACNC: ABNORMAL
LYMPHOCYTES # BLD AUTO: 2.04 K/UL — SIGNIFICANT CHANGE UP (ref 1–3.3)
LYMPHOCYTES # BLD AUTO: 49 % — HIGH (ref 13–44)
MAGNESIUM SERPL-MCNC: 2 MG/DL — SIGNIFICANT CHANGE UP (ref 1.6–2.6)
MCHC RBC-ENTMCNC: 32.3 PG — SIGNIFICANT CHANGE UP (ref 27–34)
MCHC RBC-ENTMCNC: 34 GM/DL — SIGNIFICANT CHANGE UP (ref 32–36)
MCV RBC AUTO: 95 FL — SIGNIFICANT CHANGE UP (ref 80–100)
MONOCYTES # BLD AUTO: 0.21 K/UL — SIGNIFICANT CHANGE UP (ref 0–0.9)
MONOCYTES NFR BLD AUTO: 5 % — SIGNIFICANT CHANGE UP (ref 2–14)
NEUTROPHILS # BLD AUTO: 1.83 K/UL — SIGNIFICANT CHANGE UP (ref 1.8–7.4)
NEUTROPHILS NFR BLD AUTO: 44 % — SIGNIFICANT CHANGE UP (ref 43–77)
NITRITE UR-MCNC: NEGATIVE — SIGNIFICANT CHANGE UP
NRBC # BLD: 0 /100 WBCS — SIGNIFICANT CHANGE UP (ref 0–0)
PH UR: 7 — SIGNIFICANT CHANGE UP (ref 5–8)
PLATELET # BLD AUTO: 165 K/UL — SIGNIFICANT CHANGE UP (ref 150–400)
POTASSIUM SERPL-MCNC: 4.4 MMOL/L — SIGNIFICANT CHANGE UP (ref 3.5–5.3)
POTASSIUM SERPL-SCNC: 4.4 MMOL/L — SIGNIFICANT CHANGE UP (ref 3.5–5.3)
PROT SERPL-MCNC: 6.7 G/DL — SIGNIFICANT CHANGE UP (ref 6–8.3)
PROT UR-MCNC: NEGATIVE MG/DL — SIGNIFICANT CHANGE UP
RBC # BLD: 4.21 M/UL — SIGNIFICANT CHANGE UP (ref 3.8–5.2)
RBC # FLD: 12.2 % — SIGNIFICANT CHANGE UP (ref 10.3–14.5)
RBC CASTS # UR COMP ASSIST: 2 /HPF — SIGNIFICANT CHANGE UP (ref 0–4)
SODIUM SERPL-SCNC: 139 MMOL/L — SIGNIFICANT CHANGE UP (ref 135–145)
SP GR SPEC: 1.02 — SIGNIFICANT CHANGE UP (ref 1–1.03)
SQUAMOUS # UR AUTO: 0 /HPF — SIGNIFICANT CHANGE UP (ref 0–5)
T3 SERPL-MCNC: 57 NG/DL — LOW (ref 80–200)
TROPONIN T, HIGH SENSITIVITY RESULT: <6 NG/L — SIGNIFICANT CHANGE UP (ref 0–51)
TSH SERPL-MCNC: 2.46 UIU/ML — SIGNIFICANT CHANGE UP (ref 0.27–4.2)
UROBILINOGEN FLD QL: 1 MG/DL — SIGNIFICANT CHANGE UP (ref 0.2–1)
WBC # BLD: 4.16 K/UL — SIGNIFICANT CHANGE UP (ref 3.8–10.5)
WBC # FLD AUTO: 4.16 K/UL — SIGNIFICANT CHANGE UP (ref 3.8–10.5)
WBC UR QL: 1 /HPF — SIGNIFICANT CHANGE UP (ref 0–5)

## 2024-02-06 PROCEDURE — 82550 ASSAY OF CK (CPK): CPT

## 2024-02-06 PROCEDURE — 99285 EMERGENCY DEPT VISIT HI MDM: CPT

## 2024-02-06 PROCEDURE — 85379 FIBRIN DEGRADATION QUANT: CPT

## 2024-02-06 PROCEDURE — 84484 ASSAY OF TROPONIN QUANT: CPT

## 2024-02-06 PROCEDURE — 80053 COMPREHEN METABOLIC PANEL: CPT

## 2024-02-06 PROCEDURE — 93010 ELECTROCARDIOGRAM REPORT: CPT

## 2024-02-06 PROCEDURE — 84480 ASSAY TRIIODOTHYRONINE (T3): CPT

## 2024-02-06 PROCEDURE — 93005 ELECTROCARDIOGRAM TRACING: CPT

## 2024-02-06 PROCEDURE — 71111 X-RAY EXAM RIBS/CHEST4/> VWS: CPT | Mod: 26

## 2024-02-06 PROCEDURE — 85025 COMPLETE CBC W/AUTO DIFF WBC: CPT

## 2024-02-06 PROCEDURE — 83735 ASSAY OF MAGNESIUM: CPT

## 2024-02-06 PROCEDURE — 99285 EMERGENCY DEPT VISIT HI MDM: CPT | Mod: 25

## 2024-02-06 PROCEDURE — 84443 ASSAY THYROID STIM HORMONE: CPT

## 2024-02-06 PROCEDURE — 71111 X-RAY EXAM RIBS/CHEST4/> VWS: CPT

## 2024-02-06 PROCEDURE — 36415 COLL VENOUS BLD VENIPUNCTURE: CPT

## 2024-02-06 PROCEDURE — 81001 URINALYSIS AUTO W/SCOPE: CPT

## 2024-02-06 RX ORDER — SODIUM CHLORIDE 9 MG/ML
1500 INJECTION INTRAMUSCULAR; INTRAVENOUS; SUBCUTANEOUS ONCE
Refills: 0 | Status: COMPLETED | OUTPATIENT
Start: 2024-02-06 | End: 2024-02-06

## 2024-02-06 RX ORDER — KETOROLAC TROMETHAMINE 30 MG/ML
15 SYRINGE (ML) INJECTION ONCE
Refills: 0 | Status: DISCONTINUED | OUTPATIENT
Start: 2024-02-06 | End: 2024-02-06

## 2024-02-06 RX ADMIN — SODIUM CHLORIDE 1500 MILLILITER(S): 9 INJECTION INTRAMUSCULAR; INTRAVENOUS; SUBCUTANEOUS at 05:39

## 2024-02-06 NOTE — ED PROVIDER NOTE - PROGRESS NOTE DETAILS
Asymptomatic EKG  sinus bradycardia 37 normal axis  QT prolongation normal QTc benign early repole no STEMI Troponin negative dimer negative x-ray grossly negative patient has reproducible pain is not symptomatic feeling improved after IV fluids, discussed with patient patient is extremely thin admits to amenorrhea anorexia decreased p.o. intake and not feeling well when she does not eat feels improved with eating counseled patient on importance of appropriate carbohydrate intake seeing a therapist which she is doing as well as a dietitian nutritionist and advancing p.o. at home.  Patient stood up and walked heart rate at 49 to 50 bpm is not symptomatic is well-appearing repeat EKG was stable will give strict return precautions and cardiology follow-up and advised to closely monitor herself and eat appropriately and if she feels any repeat episodes of near syncope to call 911 return to the nearest emergency

## 2024-02-06 NOTE — ED PROVIDER NOTE - OBJECTIVE STATEMENT
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  Today experiencing similar chest pressure and dizziness earlier in the evening noticed her heart rate to be in the high 30s.  Denies any recent  mRNA vaccine booster or history of myocarditis, no trauma to chest wall, denies any drug use alcohol use.  Lately has been having some left-sided chest pain  during prolonged exertion, no syncopal events,  great grandmother had early death due to cardiac condition related to strep at 40 years old.  Is on estrogen.  Has had prior partial thyroidectomy secondary to thyroid CA in 2021.  Has had no recurrence.  Denies associated shortness of breath or hemoptysis.  States that normal resting heart rate is in the 40s

## 2024-02-06 NOTE — ED ADULT NURSE NOTE - NS ED NURSE IV DC DT
06-Feb-2024 07:21 Topical Steroids Applications Pregnancy And Lactation Text: Most topical steroids are considered safe to use during pregnancy and lactation.  Any topical steroid applied to the breast or nipple should be washed off before breastfeeding.

## 2024-02-06 NOTE — ED PROVIDER NOTE - CLINICAL SUMMARY MEDICAL DECISION MAKING FREE TEXT BOX
44-year-old female with chest pain possibly overuse and strain low risk for ACS will evaluate for costochondritis/  pericarditis/cardiac strain plan for CBC CMP EKG anti-inflammatoriescardiac enzyme     EKG sinus bradycardia 45 benign early repole no Brugada question motion artifact versus flutter waves lead III, aVL and I

## 2024-02-06 NOTE — ED ADULT TRIAGE NOTE - ARRIVAL INFO ADDITIONAL COMMENTS
pt c/o several days of intermittent chest pain and tonight was awakened by it and put her apple watch on and her HR was below 40.

## 2024-02-06 NOTE — ED PROVIDER NOTE - NSFOLLOWUPINSTRUCTIONS_ED_ALL_ED_FT
You were evaluated in the ED for dizziness and chest pain, your chest pain was reproducible on exam and your chest x-ray was grossly negative for pneumothorax or rib fractures, we also tested your cardiac enzyme which was negative and did a D-dimer which evaluates low risk patients for pulmonary embolism.  All these values were negative.  You felt improved after IV fluids and food intake.  Please eat normal meals decrease your exercise until you are feeling 100% improved as I believe you have been excessively exercising and not eating enough which leads to your musculoskeletal chest pain and dizziness.  Please follow-up closely with your primary physician  and cardiologist return for any worsening symptoms.

## 2024-02-06 NOTE — ED PROVIDER NOTE - PHYSICAL EXAMINATION
VITAL SIGNS: I have reviewed nursing notes and confirm.  CONSTITUTIONAL: Well appearing, in no acute distress.   SKIN:  warm and dry, no acute rash.   HEAD:  normocephalic, atraumatic.  EYES: EOM intact; conjunctiva and sclera clear.  ENT: No nasal discharge; airway clear.   NECK: Supple.  CARD: Bradycardic but regular not hypotensive or diaphoretic well-appearing reproducible left chest wall tenderness rib tenderness  RESP:  Clear to auscultation b/l, no wheezes, rales or rhonchi.  ABD: Normal bowel sounds; soft; non-distended; non-tender; no guarding/ rebound.  EXT: Normal ROM. No peripheral edema. Pulses intact and equal b/l.  NEURO: Alert, oriented, grossly unremarkable  PSYCH: Cooperative, mood and affect appropriate.

## 2024-02-06 NOTE — ED PROVIDER NOTE - PATIENT PORTAL LINK FT
You can access the FollowMyHealth Patient Portal offered by [facility] by registering at the following website: [email or web address]. By joining [facility]’s FollowMyHealth portal, you will also be able to view your health information using other applications (apps) compatible with our system.

## 2024-02-08 ENCOUNTER — OUTPATIENT (OUTPATIENT)
Dept: OUTPATIENT SERVICES | Facility: HOSPITAL | Age: 24
LOS: 1 days | End: 2024-02-08

## 2024-02-08 ENCOUNTER — APPOINTMENT (OUTPATIENT)
Dept: ULTRASOUND IMAGING | Facility: CLINIC | Age: 24
End: 2024-02-08
Payer: COMMERCIAL

## 2024-02-08 PROCEDURE — 76536 US EXAM OF HEAD AND NECK: CPT | Mod: 26

## 2024-02-27 NOTE — ED ADULT TRIAGE NOTE - NSTRIAGECARE_GEN_A_ER
Spoke with mom who states patient has had intermittent dizziness for about one week. No other symptoms. Advised mom patient appointment would need to be changed to allow for appropriate time needed for this concern. Appointment changed to 2pm. Mom to call back if this time does not work.   Face Mask

## 2024-03-21 ENCOUNTER — APPOINTMENT (OUTPATIENT)
Dept: HEART AND VASCULAR | Facility: CLINIC | Age: 24
End: 2024-03-21

## 2024-06-18 ENCOUNTER — APPOINTMENT (OUTPATIENT)
Dept: VASCULAR SURGERY | Facility: CLINIC | Age: 24
End: 2024-06-18
Payer: COMMERCIAL

## 2024-06-18 VITALS
SYSTOLIC BLOOD PRESSURE: 104 MMHG | HEART RATE: 65 BPM | DIASTOLIC BLOOD PRESSURE: 71 MMHG | WEIGHT: 100 LBS | HEIGHT: 65 IN | BODY MASS INDEX: 16.66 KG/M2

## 2024-06-18 DIAGNOSIS — I82.890 ACUTE EMBOLISM AND THROMBOSIS OF OTHER SPECIFIED VEINS: ICD-10-CM

## 2024-06-18 PROCEDURE — 99214 OFFICE O/P EST MOD 30 MIN: CPT

## 2024-06-18 PROCEDURE — 93971 EXTREMITY STUDY: CPT

## 2024-06-18 RX ORDER — APIXABAN 5 MG/1
5 TABLET, FILM COATED ORAL
Refills: 0 | Status: ACTIVE | COMMUNITY

## 2024-06-18 RX ORDER — SERTRALINE HYDROCHLORIDE 50 MG/1
50 TABLET, FILM COATED ORAL
Refills: 0 | Status: DISCONTINUED | COMMUNITY
End: 2024-06-18

## 2024-06-19 ENCOUNTER — RESULT REVIEW (OUTPATIENT)
Age: 24
End: 2024-06-19

## 2024-06-19 PROBLEM — I82.890 SUPERFICIAL VEIN THROMBOSIS: Status: ACTIVE | Noted: 2024-06-19

## 2024-06-19 NOTE — HISTORY OF PRESENT ILLNESS
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  Pt did sustain a L clavicular fracture 9y prior when she fell on the L shoulder which required a plate and screws for ORIF.  Ms. Mir states she presented to an ED in [facility] when her symptoms progressed to include SOB/PHILLIPS, and she was evaluated w/CT chest and LUE duplex, was ruled in for L basilic vein thrombus but no other pathology was identified, including SVC thrombus or PE.  Pt recommended by local vascular surgeon in [facility] to undergo MRA UE for further evaluation, due to hardware artifact, SCV thrombus could not be ruled out and pt was started on Eliquis.  She now reports limited ROM at the L elbow w/arms abducted, no SOB/PHILLIPS but fullness in supraclavicular space and tightness in shoulder and arm still noted.  She is on Eliquis currently w/o issue.  She will be seeing neurology for evaluation of the shoulder and arm and possible EMG.

## 2024-06-19 NOTE — ASSESSMENT
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  Pt did sustain a L clavicular fracture 9y prior when she fell on the L shoulder which required a plate and screws for ORIF.  Ms. Mir states she presented to an ED in [facility] when her symptoms progressed to include SOB/PHILLIPS, and she was evaluated w/CT chest and LUE duplex, was ruled in for L basilic vein thrombus but no other pathology was identified, including SVC thrombus or PE.  Pt recommended by local vascular surgeon in [facility] to undergo MRA UE for further evaluation, due to hardware artifact, SCV thrombus could not be ruled out and pt was started on Eliquis.  She now reports limited ROM at the L elbow w/arms abducted, no SOB/PHILLIPS but fullness in supraclavicular space and tightness in shoulder and arm still noted.  She is on Eliquis currently w/o issue.  She will be seeing neurology for evaluation of the shoulder and arm and possible EMG.  On exam, no palpable cords noted in the LUE and no evidence of swelling in the LUE noted that would suggest SCV thrombus.  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  In resting, sitting position, there is stenosis noted at the pSCV suggestive of a possible extra-luminar compression, w/arm abducted to 90/180deg, there is no evidence of stenosis.  Unlikely that L basilic vein thrombus is contributing to pt's symptoms but would recommend pt continue eliquis to treat the SVT and consider CTV or venogram for further investigation.  Pt will try to obtain the CT on CD from the [place] [facility] and will f/u w/neurology for further testing.

## 2024-06-19 NOTE — DATA REVIEWED
[FreeTextEntry1] : MRA at R reviewed-no obvious L SCV thrombus identified, area obscured by clavicular hardware.

## 2024-06-19 NOTE — PROCEDURE
[FreeTextEntry1] : LUE venous duplex performed to evaluate for LUE/SCV thrombus notes chronic SVT of the L basilic vein, no definitive evidence of LUE or SCV DVT noted, complex fluid-filled structure noted in the L supraclavicular space.  In resting, sitting position, there is stenosis noted at the pSCV suggestive of a possible extra-luminar compression, w/arm abducted to 90/180deg, there is no evidence of stenosis.

## 2024-06-19 NOTE — REASON FOR VISIT
[Consultation] : a consultation visit [Parent] : parent [FreeTextEntry1] : Suspicion of L SCV thrombus

## 2024-06-19 NOTE — PHYSICAL EXAM
[JVD] : no jugular venous distention  [Normal Breath Sounds] : Normal breath sounds [Respiratory Effort] : normal respiratory effort [Normal Heart Sounds] : normal heart sounds [Normal Rate and Rhythm] : normal rate and rhythm [2+] : left 2+ [No Rash or Lesion] : No rash or lesion [Purpura] : no purpura  [Petechiae] : no petechiae [Skin Ulcer] : no ulcer [Skin Induration] : no induration [Alert] : alert [Anxious] : anxious [de-identified] : Healthy appearance, thin/athletic habitus [de-identified] : NC/AT, anicteric [de-identified] : Reproducible fullness in the L supraclavicular space, non-tender, no crepitus [de-identified] : w/arms at 90deg abduction, limited ROM noted at the L elbow w/maximal flexion of 90deg noted; no palpable cords appreciated in the LUE, no swelling noted in the LUE [de-identified] : Neurosensory grossly intact in UEs b/l

## 2024-06-21 ENCOUNTER — OUTPATIENT (OUTPATIENT)
Dept: OUTPATIENT SERVICES | Facility: HOSPITAL | Age: 24
LOS: 1 days | End: 2024-06-21
Payer: COMMERCIAL

## 2024-06-21 ENCOUNTER — APPOINTMENT (OUTPATIENT)
Dept: CT IMAGING | Facility: HOSPITAL | Age: 24
End: 2024-06-21

## 2024-06-21 PROCEDURE — 71275 CT ANGIOGRAPHY CHEST: CPT | Mod: 26

## 2024-06-21 PROCEDURE — 71275 CT ANGIOGRAPHY CHEST: CPT

## 2024-07-02 ENCOUNTER — OUTPATIENT (OUTPATIENT)
Dept: OUTPATIENT SERVICES | Facility: HOSPITAL | Age: 24
LOS: 1 days | End: 2024-07-02
Payer: COMMERCIAL

## 2024-07-02 ENCOUNTER — APPOINTMENT (OUTPATIENT)
Dept: VASCULAR SURGERY | Facility: CLINIC | Age: 24
End: 2024-07-02
Payer: COMMERCIAL

## 2024-07-02 ENCOUNTER — APPOINTMENT (OUTPATIENT)
Dept: ORTHOPEDIC SURGERY | Facility: CLINIC | Age: 24
End: 2024-07-02
Payer: COMMERCIAL

## 2024-07-02 VITALS
BODY MASS INDEX: 16.66 KG/M2 | DIASTOLIC BLOOD PRESSURE: 62 MMHG | WEIGHT: 100 LBS | HEART RATE: 60 BPM | SYSTOLIC BLOOD PRESSURE: 96 MMHG | HEIGHT: 65 IN

## 2024-07-02 VITALS
HEART RATE: 60 BPM | HEIGHT: 65 IN | WEIGHT: 103 LBS | DIASTOLIC BLOOD PRESSURE: 65 MMHG | SYSTOLIC BLOOD PRESSURE: 103 MMHG | OXYGEN SATURATION: 99 % | BODY MASS INDEX: 17.16 KG/M2

## 2024-07-02 DIAGNOSIS — R22.1 LOCALIZED SWELLING, MASS AND LUMP, NECK: ICD-10-CM

## 2024-07-02 DIAGNOSIS — I87.1 COMPRESSION OF VEIN: ICD-10-CM

## 2024-07-02 PROCEDURE — 73000 X-RAY EXAM OF COLLAR BONE: CPT

## 2024-07-02 PROCEDURE — 73000 X-RAY EXAM OF COLLAR BONE: CPT | Mod: 26,LT

## 2024-07-02 PROCEDURE — 99204 OFFICE O/P NEW MOD 45 MIN: CPT

## 2024-07-02 PROCEDURE — 99203 OFFICE O/P NEW LOW 30 MIN: CPT

## 2024-07-03 ENCOUNTER — OUTPATIENT (OUTPATIENT)
Dept: OUTPATIENT SERVICES | Facility: HOSPITAL | Age: 24
LOS: 1 days | End: 2024-07-03

## 2024-07-03 ENCOUNTER — RESULT REVIEW (OUTPATIENT)
Age: 24
End: 2024-07-03

## 2024-07-03 ENCOUNTER — APPOINTMENT (OUTPATIENT)
Dept: MRI IMAGING | Facility: HOSPITAL | Age: 24
End: 2024-07-03

## 2024-07-03 PROCEDURE — 70549 MR ANGIOGRAPH NECK W/O&W/DYE: CPT | Mod: 26

## 2024-07-03 PROCEDURE — 71552 MRI CHEST W/O & W/DYE: CPT | Mod: 26

## 2024-07-03 PROCEDURE — 71552 MRI CHEST W/O & W/DYE: CPT

## 2024-07-03 PROCEDURE — 70549 MR ANGIOGRAPH NECK W/O&W/DYE: CPT

## 2024-07-03 PROCEDURE — A9585: CPT

## 2024-07-15 RX ORDER — APIXABAN 5 MG/1
5 TABLET, FILM COATED ORAL
Qty: 60 | Refills: 0 | Status: DISCONTINUED | COMMUNITY
Start: 2024-07-12 | End: 2024-07-15

## 2024-07-15 RX ORDER — APIXABAN 2.5 MG/1
2.5 TABLET, FILM COATED ORAL TWICE DAILY
Qty: 60 | Refills: 0 | Status: ACTIVE | COMMUNITY
Start: 2024-07-15 | End: 1900-01-01

## 2024-07-26 DIAGNOSIS — D23.62 OTHER BENIGN NEOPLASM OF SKIN OF LEFT UPPER LIMB, INCLUDING SHOULDER: ICD-10-CM

## 2024-08-01 VITALS
SYSTOLIC BLOOD PRESSURE: 104 MMHG | TEMPERATURE: 98 F | HEART RATE: 46 BPM | RESPIRATION RATE: 16 BRPM | HEIGHT: 65 IN | DIASTOLIC BLOOD PRESSURE: 67 MMHG | OXYGEN SATURATION: 100 % | WEIGHT: 101.63 LBS

## 2024-08-01 NOTE — ASU PATIENT PROFILE, ADULT - NSICDXPASTMEDICALHX_GEN_ALL_CORE_FT
PAST MEDICAL HISTORY:  H/O cardiac disorder     H/O diabetes mellitus     H/O thyroid disease     Post concussive syndrome      PAST MEDICAL HISTORY:  Blood clot in vein     Post concussive syndrome     Thyroid cancer

## 2024-08-01 NOTE — ASU PATIENT PROFILE, ADULT - NSICDXPASTSURGICALHX_GEN_ALL_CORE_FT
PAST SURGICAL HISTORY:  History of open reduction and internal fixation (ORIF) procedure (L) Clavicular Fracture - Plate and Screws - 9 years ago     PAST SURGICAL HISTORY:  H/O partial thyroidectomy Right Hemithyroidectomy    History of open reduction and internal fixation (ORIF) procedure (L) Clavicular Fracture - Plate and Screws - 9 years ago     PAST SURGICAL HISTORY:  H/O partial thyroidectomy Right Hemithyroidectomy and Lymph Nodes    History of open reduction and internal fixation (ORIF) procedure (L) Clavicular Fracture - Plate and Screws - 9 years ago

## 2024-08-01 NOTE — ASU PATIENT PROFILE, ADULT - REASON FOR ADMISSION, PROFILE
LEFT Neck Supraclavicular Fossa Exploration for Resection of Supraclavicular Mass, Removal of LEFT Clavicular Reconstruction Plate Linda Beasley  (RN)  2020 18:21:36

## 2024-08-02 ENCOUNTER — INPATIENT (INPATIENT)
Facility: HOSPITAL | Age: 24
LOS: 1 days | Discharge: HOME | End: 2024-08-04
Attending: SPECIALIST | Admitting: SPECIALIST
Payer: COMMERCIAL

## 2024-08-02 ENCOUNTER — APPOINTMENT (OUTPATIENT)
Dept: VASCULAR SURGERY | Facility: HOSPITAL | Age: 24
End: 2024-08-02

## 2024-08-02 DIAGNOSIS — Z98.890 OTHER SPECIFIED POSTPROCEDURAL STATES: Chronic | ICD-10-CM

## 2024-08-02 DIAGNOSIS — E89.0 POSTPROCEDURAL HYPOTHYROIDISM: Chronic | ICD-10-CM

## 2024-08-02 LAB
GRAM STN FLD: SIGNIFICANT CHANGE UP
GRAM STN FLD: SIGNIFICANT CHANGE UP
SPECIMEN SOURCE: SIGNIFICANT CHANGE UP
SPECIMEN SOURCE: SIGNIFICANT CHANGE UP

## 2024-08-02 PROCEDURE — 88300 SURGICAL PATH GROSS: CPT | Mod: 26,59

## 2024-08-02 PROCEDURE — 88304 TISSUE EXAM BY PATHOLOGIST: CPT | Mod: 26

## 2024-08-02 DEVICE — CLIP APPLIER ETHICON LIGACLIP 11.5" MEDIUM: Type: IMPLANTABLE DEVICE | Status: FUNCTIONAL

## 2024-08-02 DEVICE — SURGCEL 4 X 8": Type: IMPLANTABLE DEVICE | Status: FUNCTIONAL

## 2024-08-02 DEVICE — CLIP APPLIER ETHICON LIGACLIP 9 3/8" SMALL: Type: IMPLANTABLE DEVICE | Status: FUNCTIONAL

## 2024-08-02 DEVICE — SURGIFOAM PAD 8CM X 12.5CM X 10MM (100): Type: IMPLANTABLE DEVICE | Status: FUNCTIONAL

## 2024-08-02 RX ORDER — HYDROMORPHONE HCL IN 0.9% NACL 0.2 MG/ML
0.5 PLASTIC BAG, INJECTION (ML) INTRAVENOUS
Refills: 0 | Status: DISCONTINUED | OUTPATIENT
Start: 2024-08-02 | End: 2024-08-04

## 2024-08-02 RX ORDER — CEFAZOLIN SODIUM 10 G
2000 VIAL (EA) INJECTION EVERY 8 HOURS
Refills: 0 | Status: DISCONTINUED | OUTPATIENT
Start: 2024-08-02 | End: 2024-08-02

## 2024-08-02 RX ORDER — OXYCODONE AND ACETAMINOPHEN 10; 325 MG/1; MG/1
1 TABLET ORAL EVERY 6 HOURS
Refills: 0 | Status: DISCONTINUED | OUTPATIENT
Start: 2024-08-02 | End: 2024-08-04

## 2024-08-02 RX ORDER — HEPARIN SODIUM 1000 [USP'U]/ML
5000 INJECTION, SOLUTION INTRAVENOUS; SUBCUTANEOUS EVERY 12 HOURS
Refills: 0 | Status: DISCONTINUED | OUTPATIENT
Start: 2024-08-02 | End: 2024-08-04

## 2024-08-02 RX ORDER — VANCOMYCIN HYDROCHLORIDE 5 G/100ML
750 INJECTION, POWDER, LYOPHILIZED, FOR SOLUTION INTRAVENOUS EVERY 12 HOURS
Refills: 0 | Status: DISCONTINUED | OUTPATIENT
Start: 2024-08-02 | End: 2024-08-03

## 2024-08-02 RX ORDER — VANCOMYCIN HYDROCHLORIDE 5 G/100ML
691.5 INJECTION, POWDER, LYOPHILIZED, FOR SOLUTION INTRAVENOUS EVERY 12 HOURS
Refills: 0 | Status: DISCONTINUED | OUTPATIENT
Start: 2024-08-02 | End: 2024-08-02

## 2024-08-02 RX ORDER — ACETAMINOPHEN 500 MG
650 TABLET ORAL EVERY 6 HOURS
Refills: 0 | Status: DISCONTINUED | OUTPATIENT
Start: 2024-08-02 | End: 2024-08-04

## 2024-08-02 RX ORDER — ONDANSETRON HCL/PF 4 MG/2 ML
4 VIAL (ML) INJECTION ONCE
Refills: 0 | Status: DISCONTINUED | OUTPATIENT
Start: 2024-08-02 | End: 2024-08-04

## 2024-08-02 RX ORDER — VANCOMYCIN HYDROCHLORIDE 5 G/100ML
750 INJECTION, POWDER, LYOPHILIZED, FOR SOLUTION INTRAVENOUS ONCE
Refills: 0 | Status: DISCONTINUED | OUTPATIENT
Start: 2024-08-02 | End: 2024-08-03

## 2024-08-02 RX ORDER — DEXTROSE MONOHYDRATE, SODIUM CHLORIDE, SODIUM LACTATE, CALCIUM CHLORIDE, MAGNESIUM CHLORIDE 1.5; 538; 448; 18.4; 5.08 G/100ML; MG/100ML; MG/100ML; MG/100ML; MG/100ML
1000 SOLUTION INTRAPERITONEAL
Refills: 0 | Status: DISCONTINUED | OUTPATIENT
Start: 2024-08-02 | End: 2024-08-02

## 2024-08-02 RX ADMIN — HEPARIN SODIUM 5000 UNIT(S): 1000 INJECTION, SOLUTION INTRAVENOUS; SUBCUTANEOUS at 17:44

## 2024-08-02 RX ADMIN — Medication 650 MILLIGRAM(S): at 21:25

## 2024-08-02 RX ADMIN — Medication 100 MILLIGRAM(S): at 14:52

## 2024-08-02 RX ADMIN — Medication 650 MILLIGRAM(S): at 22:25

## 2024-08-02 NOTE — PROGRESS NOTE ADULT - SUBJECTIVE AND OBJECTIVE BOX
Vascular Surgery Post-Op Note    Procedure: Removal of hardware via L clavicle exploration    Diagnosis/Indication: L subclavian DVT    Surgeon: Dr. Bhatia    S: Pt has no complaints, resting comfortably in bed. Denies CP, SOB, PHILLIPS, numbness, tingling of LUE. Pain controlled with medication. Endorses mild discomfort around incision site.    O:  T(C): 36.7 (08-02-24 @ 12:03), Max: 36.7 (08-02-24 @ 12:03)  T(F): 98 (08-02-24 @ 12:03), Max: 98 (08-02-24 @ 12:03)  HR: 45 (08-02-24 @ 12:03) (45 - 53)  BP: 90/52 (08-02-24 @ 12:03) (88/52 - 99/56)  RR: 8 (08-02-24 @ 12:03) (8 - 19)  SpO2: 99% (08-02-24 @ 12:03) (95% - 99%)  Wt(kg): --            Gen: NAD, resting comfortably in bed  C/V: NSR  Pulm: On room air, nonlabored breathing, no respiratory distress  Abd: soft  Extrem: L supraclavicular incision c/d/i, soft, no palpable hematoma. WWP. motor and sensation intact in LUE      A/P: 24yFemale s/p above procedure  Diet: regular  IVF: LR at 75cc/hr  Pain/nausea control  DVT ppx: [facility]  Dispo plan: 9 [facility] Post-Op Note    Procedure: Removal of hardware via L clavicle exploration    Diagnosis/Indication: L subclavian DVT    Surgeon: Dr. Bhatia    S: Pt has no complaints, resting comfortably in bed. Denies CP, SOB, PHILLIPS, numbness, tingling of LUE. Pain controlled with medication. Endorses mild discomfort around incision site.    O:  T(C): 36.7 (08-02-24 @ 12:03), Max: 36.7 (08-02-24 @ 12:03)  T(F): 98 (08-02-24 @ 12:03), Max: 98 (08-02-24 @ 12:03)  HR: 45 (08-02-24 @ 12:03) (45 - 53)  BP: 90/52 (08-02-24 @ 12:03) (88/52 - 99/56)  RR: 8 (08-02-24 @ 12:03) (8 - 19)  SpO2: 99% (08-02-24 @ 12:03) (95% - 99%)  Wt(kg): --            Gen: NAD, resting comfortably in bed  C/V: NSR  Pulm: On room air, nonlabored breathing, no respiratory distress  Abd: soft  Extrem: L supraclavicular incision c/d/i, soft, no palpable hematoma. hemovac in place w/ minimal output. WWP. motor and sensation intact in LUE      A/P: 24yFemale s/p above procedure  Diet: regular  IVF: LR at 75cc/hr  Pain/nausea control  DVT ppx: [facility]  Dispo plan: 9 [facility]

## 2024-08-02 NOTE — H&P ADULT - HISTORY OF PRESENT ILLNESS
24F PMH PTC sp R abril, chronic SVT L basilic vein on eliquis, L clavicular fracture sp plate & screw placement 9 years ago here for L supraclavicular mass excision, L clavicle hardware exploration and removal 8/2.

## 2024-08-02 NOTE — H&P ADULT - ASSESSMENT
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 Intraop, abscess visualized next to hardware, no obvious bone infection.     PLAN:  - Admit to tele under [place]  - f/u ID for abx recs  - f/u labs  - blood cx  - f/u OR cx   - hemovac drain until <15 cc an hour  - pain control  - regular diet

## 2024-08-02 NOTE — PRE-ANESTHESIA EVALUATION ADULT - NSPROPOSEDPROCEDFT_GEN_ALL_CORE
Left Collar/Supraclavicular incision for access to previously placed clavicle plate.  Resection of left supraclavicular mass.  LUE neurophysiolgic monitoring. Left clavicle exploration and possible removal of hardware, possible plate/screw implant

## 2024-08-02 NOTE — H&P ADULT - NSICDXPASTSURGICALHX_GEN_ALL_CORE_FT
PAST SURGICAL HISTORY:  H/O partial thyroidectomy Right Hemithyroidectomy and Lymph Nodes    History of open reduction and internal fixation (ORIF) procedure (L) Clavicular Fracture - Plate and Screws - 9 years ago

## 2024-08-02 NOTE — PROVIDER CONTACT NOTE (OTHER) - ASSESSMENT
Pt is alert and oriented, VSS. Pt's Cefazolin d/c by team. Vancomycin IV and Ceftriaxone IV added with no start time.

## 2024-08-02 NOTE — PROVIDER CONTACT NOTE (OTHER) - ACTION/TREATMENT ORDERED:
Per ENT, ok to wait to start antibiotics until AM. Give Ceftriaxone IV first at 06:00 and do not give IV Vancomycin until AM labs result for Creatinine clearance. Care ongoing.

## 2024-08-02 NOTE — CHART NOTE - NSCHARTNOTEFT_GEN_A_CORE
ID consulted for Abx recs. Pt was in OR today w ENT/Vascular and a supraclavicular lesion was explored, felt to be abscess. OR cx were sent per ENT.  No history/labs were available for review.   I recc check CBC w diff, CMP, ESR, CRP, f/u OR culture.  Prelim Abx recs are:  IV Vancomycin 15 mg/kg q12h if renal function WNL - else call on call ID for recs  IV Ceftriaxone 2 gm q24h  Full consult to follow in am  D/w primary team

## 2024-08-02 NOTE — H&P ADULT - NSHPPHYSICALEXAM_GEN_ALL_CORE
Gen: NAD, resting comfortably in bed  Resp: On room air, nonlabored breathing, no respiratory distress  HEENT: L supraclavicular incision c/d/i, soft, no palpable hematoma. hemovac in place

## 2024-08-03 LAB
ALBUMIN SERPL ELPH-MCNC: 4.3 G/DL — SIGNIFICANT CHANGE UP (ref 3.3–5)
ALP SERPL-CCNC: 63 U/L — SIGNIFICANT CHANGE UP (ref 40–120)
ALT FLD-CCNC: 16 U/L — SIGNIFICANT CHANGE UP (ref 10–45)
ANION GAP SERPL CALC-SCNC: 9 MMOL/L — SIGNIFICANT CHANGE UP (ref 5–17)
AST SERPL-CCNC: 16 U/L — SIGNIFICANT CHANGE UP (ref 10–40)
BASOPHILS # BLD AUTO: 0.05 K/UL — SIGNIFICANT CHANGE UP (ref 0–0.2)
BASOPHILS NFR BLD AUTO: 0.8 % — SIGNIFICANT CHANGE UP (ref 0–2)
BILIRUB SERPL-MCNC: <0.2 MG/DL — SIGNIFICANT CHANGE UP (ref 0.2–1.2)
BUN SERPL-MCNC: 16 MG/DL — SIGNIFICANT CHANGE UP (ref 7–23)
CALCIUM SERPL-MCNC: 8.7 MG/DL — SIGNIFICANT CHANGE UP (ref 8.4–10.5)
CHLORIDE SERPL-SCNC: 105 MMOL/L — SIGNIFICANT CHANGE UP (ref 96–108)
CO2 SERPL-SCNC: 26 MMOL/L — SIGNIFICANT CHANGE UP (ref 22–31)
CREAT SERPL-MCNC: 0.85 MG/DL — SIGNIFICANT CHANGE UP (ref 0.5–1.3)
CRP SERPL-MCNC: <3 MG/L — SIGNIFICANT CHANGE UP (ref 0–4)
EGFR: 97 ML/MIN/1.73M2 — SIGNIFICANT CHANGE UP
EOSINOPHIL # BLD AUTO: 0.1 K/UL — SIGNIFICANT CHANGE UP (ref 0–0.5)
EOSINOPHIL NFR BLD AUTO: 1.5 % — SIGNIFICANT CHANGE UP (ref 0–6)
ERYTHROCYTE [SEDIMENTATION RATE] IN BLOOD: 5 MM/HR — SIGNIFICANT CHANGE UP
GLUCOSE SERPL-MCNC: 120 MG/DL — HIGH (ref 70–99)
GRAM STN FLD: SIGNIFICANT CHANGE UP
HCT VFR BLD CALC: 39.2 % — SIGNIFICANT CHANGE UP (ref 34.5–45)
HGB BLD-MCNC: 12.9 G/DL — SIGNIFICANT CHANGE UP (ref 11.5–15.5)
IMM GRANULOCYTES NFR BLD AUTO: 0.3 % — SIGNIFICANT CHANGE UP (ref 0–0.9)
LYMPHOCYTES # BLD AUTO: 1.26 K/UL — SIGNIFICANT CHANGE UP (ref 1–3.3)
LYMPHOCYTES # BLD AUTO: 19.3 % — SIGNIFICANT CHANGE UP (ref 13–44)
MAGNESIUM SERPL-MCNC: 2.2 MG/DL — SIGNIFICANT CHANGE UP (ref 1.6–2.6)
MCHC RBC-ENTMCNC: 32.9 GM/DL — SIGNIFICANT CHANGE UP (ref 32–36)
MCHC RBC-ENTMCNC: 33 PG — SIGNIFICANT CHANGE UP (ref 27–34)
MCV RBC AUTO: 100.3 FL — HIGH (ref 80–100)
MONOCYTES # BLD AUTO: 0.44 K/UL — SIGNIFICANT CHANGE UP (ref 0–0.9)
MONOCYTES NFR BLD AUTO: 6.7 % — SIGNIFICANT CHANGE UP (ref 2–14)
NEUTROPHILS # BLD AUTO: 4.66 K/UL — SIGNIFICANT CHANGE UP (ref 1.8–7.4)
NEUTROPHILS NFR BLD AUTO: 71.4 % — SIGNIFICANT CHANGE UP (ref 43–77)
NRBC # BLD: 0 /100 WBCS — SIGNIFICANT CHANGE UP (ref 0–0)
PHOSPHATE SERPL-MCNC: 4.6 MG/DL — HIGH (ref 2.5–4.5)
PLATELET # BLD AUTO: 158 K/UL — SIGNIFICANT CHANGE UP (ref 150–400)
POTASSIUM SERPL-MCNC: 3.8 MMOL/L — SIGNIFICANT CHANGE UP (ref 3.5–5.3)
POTASSIUM SERPL-SCNC: 3.8 MMOL/L — SIGNIFICANT CHANGE UP (ref 3.5–5.3)
PROT SERPL-MCNC: 6.5 G/DL — SIGNIFICANT CHANGE UP (ref 6–8.3)
RBC # BLD: 3.91 M/UL — SIGNIFICANT CHANGE UP (ref 3.8–5.2)
RBC # FLD: 11.8 % — SIGNIFICANT CHANGE UP (ref 10.3–14.5)
SODIUM SERPL-SCNC: 140 MMOL/L — SIGNIFICANT CHANGE UP (ref 135–145)
SPECIMEN SOURCE: SIGNIFICANT CHANGE UP
WBC # BLD: 6.53 K/UL — SIGNIFICANT CHANGE UP (ref 3.8–10.5)
WBC # FLD AUTO: 6.53 K/UL — SIGNIFICANT CHANGE UP (ref 3.8–10.5)

## 2024-08-03 PROCEDURE — 99222 1ST HOSP IP/OBS MODERATE 55: CPT

## 2024-08-03 RX ORDER — VANCOMYCIN HYDROCHLORIDE 5 G/100ML
750 INJECTION, POWDER, LYOPHILIZED, FOR SOLUTION INTRAVENOUS EVERY 12 HOURS
Refills: 0 | Status: DISCONTINUED | OUTPATIENT
Start: 2024-08-03 | End: 2024-08-04

## 2024-08-03 RX ADMIN — HEPARIN SODIUM 5000 UNIT(S): 1000 INJECTION, SOLUTION INTRAVENOUS; SUBCUTANEOUS at 06:11

## 2024-08-03 RX ADMIN — VANCOMYCIN HYDROCHLORIDE 250 MILLIGRAM(S): 5 INJECTION, POWDER, LYOPHILIZED, FOR SOLUTION INTRAVENOUS at 17:59

## 2024-08-03 RX ADMIN — HEPARIN SODIUM 5000 UNIT(S): 1000 INJECTION, SOLUTION INTRAVENOUS; SUBCUTANEOUS at 17:59

## 2024-08-03 RX ADMIN — Medication 100 MILLIGRAM(S): at 06:11

## 2024-08-03 NOTE — PATIENT PROFILE ADULT - FALL HARM RISK - UNIVERSAL INTERVENTIONS
Bed in lowest position, wheels locked, appropriate side rails in place/Call bell, personal items and telephone in reach/Instruct patient to call for assistance before getting out of bed or chair/Non-slip footwear when patient is out of bed/[place] to call system/Physically safe environment - no spills, clutter or unnecessary equipment/Purposeful Proactive Rounding/Room/bathroom lighting operational, light cord in reach

## 2024-08-03 NOTE — PROGRESS NOTE ADULT - SUBJECTIVE AND OBJECTIVE BOX
Subjective: Patient seen, doing well post-operatively with no acute concerns.     ROS:   Denies Headache, blurred vision, Chest Pain, SOB, Abdominal pain, nausea or vomiting     Social   cefTRIAXone   IVPB 2000  vancomycin  IVPB 750  vancomycin  IVPB 750  cefTRIAXone   IVPB 2000  heparin   Injectable 5000  vancomycin  IVPB 750  vancomycin  IVPB 750      Allergies    No Known Allergies    Intolerances    None    Vital Signs Last 24 Hrs  T(C): 36.5 (03 Aug 2024 13:00), Max: 37.2 (02 Aug 2024 20:47)  T(F): 97.7 (03 Aug 2024 13:00), Max: 99 (02 Aug 2024 20:47)  HR: 54 (03 Aug 2024 13:00) (45 - 64)  BP: 99/54 (03 Aug 2024 13:00) (90/52 - 103/59)  BP(mean): --  RR: 18 (03 Aug 2024 13:00) (17 - 18)  SpO2: 100% (03 Aug 2024 13:00) (96% - 100%)    Parameters below as of 03 Aug 2024 13:00  Patient On (Oxygen Delivery Method): room air      I&O's Summary    02 Aug 2024 07:01  -  03 Aug 2024 07:00  --------------------------------------------------------  IN: 575 mL / OUT: 2655 mL / NET: -2080 mL    03 Aug 2024 07:01  -  03 Aug 2024 13:38  --------------------------------------------------------  IN: 0 mL / OUT: 805 mL / NET: -805 mL        Gen: NAD, resting comfortably in bed  C/V: NSR  Pulm: On room air, nonlabored breathing, no respiratory distress  Abd: soft  Extrem: L supraclavicular incision c/d/i, soft, no palpable hematoma. hemovac in place w/ minimal output. WWP. motor and sensation intact in LUE      LABS:                        12.9   6.53  )-----------( 158      ( 03 Aug 2024 05:30 )             39.2     08-03    140  |  105  |  16  ----------------------------<  120<H>  3.8   |  26  |  0.85    Ca    8.7      03 Aug 2024 05:30  Phos  4.6     08-03  Mg     2.2     08-03    TPro  6.5  /  Alb  4.3  /  TBili  <0.2  /  DBili  x   /  AST  16  /  ALT  16  /  AlkPhos  63  08-03    A/P: 24yFemale s/p above procedure    Diet: regular  IVF: LR at 75cc/hr  Pain/nausea control  DVT ppx: SQH  Dispo plan:  [place]

## 2024-08-03 NOTE — CONSULT NOTE ADULT - ATTENDING COMMENTS
Agree w above. 24F w pmhx eating d/o (anorexia), L clavicle fx s/p ORIF w hardware 9 yr ago and L basilic vein SVT on eliquis was found to have a L supraclaviular mobile nontender lump which was explored in OR. Per ENT, purulent material noted in OR. OR cx ngtd.   On exam, has L neck KAIT drain, mildly TTP over platysma.   Continue IV Vancomycin 750 mg q12h, check Vanco trough prior to 4th dose and cont IV CTX 2gm q24h.  Check CT/MRI neck to assess depth of infectious process.   Reports diarrhea but has been on magnesium supplements. Check C diff, GI PCR.   ID Team 1 will follow. Agree w above. 24F w pmhx eating d/o (anorexia), L clavicle fx s/p ORIF w hardware 9 yr ago and L basilic vein SVT on eliquis was found to have a L supraclaviular mobile nontender lump which was explored in OR. Per ENT, purulent material noted in OR. OR cx ngtd.   D/w Dr. Garcia, hardware explanted in OR and no c/f bone involvement.   ESR and CRP are WNL.  On exam, has L neck KAIT drain, mildly TTP over platysma.   Continue IV Vancomycin 750 mg q12h, check Vanco trough prior to 4th dose and cont IV CTX 2gm q24h.  Check CT/MRI neck to assess depth of infectious process.   Reports diarrhea but has been on magnesium supplements. Check C diff, GI PCR.   ID Team 1 will follow.

## 2024-08-03 NOTE — CONSULT NOTE ADULT - SUBJECTIVE AND OBJECTIVE BOX
Ms. Mir is a 24 year old with a history of chronic SVT L basilic vein on eliquis, L clavicular fracture sp plate & screw placement 9 years ago sp L supraclavicular mass excision, L clavicle hardware exploration and removal 8/2. On discussion with Dr. Garcia, intraoperatively purulent material was tracking to the lag screw in the clavicular hardware, no gross evidence of osseous infection. Per patient intraoperatively small amount of material left around possible lymphatic vessel. Patient otherwise reports some fluid in the area, mild tenderness over the previous several weeks. She denies skin break, trauma, or pimples to the area that may have been an itis for infection. She lives in [place] but frequently travels to [place] and [place], works as an MA. She reports recovery from an eating disorder so reports that she has some systyemic symptoms at baseline that make it hard to tell what would be a symptoms of infection. She reports loose bowel movements over the previous several weeks, intermittent nausea over the last 1-2 weeeks. She has not used antibiotics since January 2023. Normal activities are walking and horse back riding.     Otherwise: Denies fevers, chills, chest pain, shortness of breath, abdominal pain, constipation.       Allergies    No Known Allergies    Intolerances      Antimicrobials:  cefTRIAXone   IVPB 2000 milliGRAM(s) IV Intermittent every 24 hours  vancomycin  IVPB 750 milliGRAM(s) IV Intermittent every 12 hours      Other Medications:  acetaminophen     Tablet .. 650 milliGRAM(s) Oral every 6 hours PRN  heparin   Injectable 5000 Unit(s) SubCutaneous every 12 hours  HYDROmorphone  Injectable 0.5 milliGRAM(s) IV Push every 15 minutes  ondansetron Injectable 4 milliGRAM(s) IV Push once PRN  oxycodone    5 mG/acetaminophen 325 mG 1 Tablet(s) Oral every 6 hours PRN      FAMILY HISTORY:    PAST MEDICAL & SURGICAL HISTORY:  Post concussive syndrome      Blood clot in vein      Thyroid cancer      History of open reduction and internal fixation (ORIF) procedure  (L) Clavicular Fracture - Plate and Screws - 9 years ago      H/O partial thyroidectomy  Right Hemithyroidectomy and Lymph Nodes      VITAL SIGNS:  T(C): 37.1 (08-03-24 @ 17:00), Max: 37.2 (08-02-24 @ 20:47)  T(F): 98.7 (08-03-24 @ 17:00), Max: 99 (08-02-24 @ 20:47)  HR: 55 (08-03-24 @ 17:00) (46 - 64)  BP: 98/60 (08-03-24 @ 17:00) (90/52 - 103/59)  BP(mean): --  RR: 18 (08-03-24 @ 17:00) (17 - 18)  SpO2: 98% (08-03-24 @ 17:00) (96% - 100%)    PHYSICAL EXAM:  Constitutional: Patient is in no acute distress, resting comfortably in bed.  HEENT: Atraumatic/normocephalic anicteric sclera, mucous membranes moist.   Respiratory: Clear to auscultation bilaterally; no Wheezing/Crackles/Ronchi, no accessory muscle use.   Cardiac: Regular rate and rhythm, S1/S2; no Murmur/Rub/Gallop;   Gastrointestinal: abdomen soft, non-tender and non-distended;  Extremities: Warm and Well perfused.  Vascular: 2+ radial, Dorsalis pedis and posterior tibial pulses bilaterally.  Dermatologic: skin warm, dry and intact;  Neurologic: AAOx3;    Lab Results:                        12.9   6.53  )-----------( 158      ( 03 Aug 2024 05:30 )             39.2     08-03    140  |  105  |  16  ----------------------------<  120<H>  3.8   |  26  |  0.85    Ca    8.7      03 Aug 2024 05:30  Phos  4.6     08-03  Mg     2.2     08-03    TPro  6.5  /  Alb  4.3  /  TBili  <0.2  /  DBili  x   /  AST  16  /  ALT  16  /  AlkPhos  63  08-03    LIVER FUNCTIONS - ( 03 Aug 2024 05:30 )  Alb: 4.3 g/dL / Pro: 6.5 g/dL / ALK PHOS: 63 U/L / ALT: 16 U/L / AST: 16 U/L / GGT: x             Urinalysis Basic - ( 03 Aug 2024 05:30 )    Color: x / Appearance: x / SG: x / pH: x  Gluc: 120 mg/dL / Ketone: x  / Bili: x / Urobili: x   Blood: x / Protein: x / Nitrite: x   Leuk Esterase: x / RBC: x / WBC x   Sq Epi: x / Non Sq Epi: x / Bacteria: x   Ms. Mir is a 24 year old with a history of chronic SVT L basilic vein on eliquis, L clavicular fracture sp plate & screw placement 9 years ago sp L supraclavicular mass excision, L clavicle hardware exploration and removal 8/2. On discussion with Dr. Garcia, intraoperatively purulent material was tracking to the lag screw in the clavicular hardware, no gross evidence of osseous infection. Per patient intraoperatively small amount of material left around possible lymphatic vessel. Patient otherwise reports some fluid in the area, mild tenderness over the previous several weeks. She denies skin break, trauma, or pimples to the area that may have been an itis for infection. 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 She reports loose bowel movements over the previous several weeks, intermittent nausea over the last 1-2 weeks She has not used antibiotics since January 2023. Normal activities are walking and horse back riding.     Otherwise: Denies fevers, chills, chest pain, shortness of breath, abdominal pain, constipation.       Allergies    No Known Allergies    Intolerances      Antimicrobials:  cefTRIAXone   IVPB 2000 milliGRAM(s) IV Intermittent every 24 hours  vancomycin  IVPB 750 milliGRAM(s) IV Intermittent every 12 hours      Other Medications:  acetaminophen     Tablet .. 650 milliGRAM(s) Oral every 6 hours PRN  heparin   Injectable 5000 Unit(s) SubCutaneous every 12 hours  HYDROmorphone  Injectable 0.5 milliGRAM(s) IV Push every 15 minutes  ondansetron Injectable 4 milliGRAM(s) IV Push once PRN  oxycodone    5 mG/acetaminophen 325 mG 1 Tablet(s) Oral every 6 hours PRN      FAMILY HISTORY:    PAST MEDICAL & SURGICAL HISTORY:  Post concussive syndrome      Blood clot in vein      Thyroid cancer      History of open reduction and internal fixation (ORIF) procedure  (L) Clavicular Fracture - Plate and Screws - 9 years ago      H/O partial thyroidectomy  Right Hemithyroidectomy and Lymph Nodes      VITAL SIGNS:  T(C): 37.1 (08-03-24 @ 17:00), Max: 37.2 (08-02-24 @ 20:47)  T(F): 98.7 (08-03-24 @ 17:00), Max: 99 (08-02-24 @ 20:47)  HR: 55 (08-03-24 @ 17:00) (46 - 64)  BP: 98/60 (08-03-24 @ 17:00) (90/52 - 103/59)  BP(mean): --  RR: 18 (08-03-24 @ 17:00) (17 - 18)  SpO2: 98% (08-03-24 @ 17:00) (96% - 100%)    PHYSICAL EXAM:  Constitutional: Patient is in no acute distress, resting comfortably in bed.  HEENT: Atraumatic/normocephalic anicteric sclera, mucous membranes moist.   Respiratory: Clear to auscultation bilaterally; no Wheezing/Crackles/Ronchi, no accessory muscle use.   Cardiac: Regular rate and rhythm, S1/S2; no Murmur/Rub/Gallop;   Gastrointestinal: abdomen soft, non-tender and non-distended;  Extremities: Warm and Well perfused.  Vascular: 2+ radial, Dorsalis pedis and posterior tibial pulses bilaterally.  Dermatologic: skin warm, dry and intact;  Neurologic: AAOx3;    Lab Results:                        12.9   6.53  )-----------( 158      ( 03 Aug 2024 05:30 )             39.2     08-03    140  |  105  |  16  ----------------------------<  120<H>  3.8   |  26  |  0.85    Ca    8.7      03 Aug 2024 05:30  Phos  4.6     08-03  Mg     2.2     08-03    TPro  6.5  /  Alb  4.3  /  TBili  <0.2  /  DBili  x   /  AST  16  /  ALT  16  /  AlkPhos  63  08-03    LIVER FUNCTIONS - ( 03 Aug 2024 05:30 )  Alb: 4.3 g/dL / Pro: 6.5 g/dL / ALK PHOS: 63 U/L / ALT: 16 U/L / AST: 16 U/L / GGT: x             Urinalysis Basic - ( 03 Aug 2024 05:30 )    Color: x / Appearance: x / SG: x / pH: x  Gluc: 120 mg/dL / Ketone: x  / Bili: x / Urobili: x   Blood: x / Protein: x / Nitrite: x   Leuk Esterase: x / RBC: x / WBC x   Sq Epi: x / Non Sq Epi: x / Bacteria: x

## 2024-08-03 NOTE — PROGRESS NOTE ADULT - ASSESSMENT
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 Intraop, abscess visualized next to hardware, no obvious bone infection. Intraoperative abscess cultures with NGTD.     PLAN:  - C/w tele  - c/w vancomycin and ceftriaxone per ID recs  - will consider PICC line placement pending discussion with ID  - f/u labs  - blood cx  - f/u OR cx   - maintain hemovac drain, will remove during  - pain control  - regular diet   24F PMH PTC sp R abril, chronic SVT L basilic vein on eliquis, L clavicular fracture sp plate & screw placement 9 years ago sp L supraclavicular mass excision, L clavicle hardware exploration and removal 8/2. Intraop, abscess visualized next to hardware, no obvious bone infection. Intraoperative abscess cultures with NGTD.     PLAN:  - C/w tele  - c/w vancomycin and ceftriaxone per ID recs  - will consider PICC line placement pending discussion with ID  - f/u labs  - blood cx  - f/u OR cx   - maintain hemovac drain, will remove during postoperative follow up appointment with Dr. Basilio  - pain control  - regular diet  - patient takes eliquis at home for chronic basilic vein SVT; will restart once hemovac drain is removed.  - will start on 325 mg aspirin morning of 8/4  - patient will have postoperative follow up with Dr. Basilio in [place] office at noon: [facility] , 4th floor, [place]

## 2024-08-03 NOTE — CONSULT NOTE ADULT - ASSESSMENT
Ms. Mir is a 24 year old with a history of chronic SVT L basilic vein on eliquis, L clavicular fracture sp plate & screw placement 9 years ago sp L supraclavicular mass excision, L clavicle hardware exploration and removal 8/2. On discussion with Dr. Garcia, intraoperatively purulent material was tracking to the lag screw in the clavicular hardware, no gross evidence of osseous infection. Per patient intraoperatively small amount of material left around possible lymphatic vessel. ID consulted for antibiotic recommendations.     # Left clavicular abscess;   # Diarrhea  At this time duration of treatment depends on extent of infection into deeper tissues, ESR/CRP low making OM unlikely. Would recommend imaging with MRI/CT scan to assess for depth of collection/OM, likely will need treatment for Myositis vs skin and soft tissue infection. Culture data negative thus far. Patient reports some diarrhea over the previous several weeks, though reports chronic use of magnesium supplements.     - Follow up intraoperative cultures and blood cultures.   - Continue CTX 2 grams q24.   - Continue Vancomycin 750 q12 hours, trough 7/5 AM.   - MRI/CT scan to assess extent of infection  - Cdiff/GI PCR to rule out infectious sources.     Case discussed with DR. Greer.

## 2024-08-03 NOTE — PATIENT PROFILE ADULT - FUNCTIONAL ASSESSMENT - BASIC MOBILITY 6.
4-calculated by average/Not able to assess (calculate score using [facility] averaging method)

## 2024-08-03 NOTE — PROGRESS NOTE ADULT - SUBJECTIVE AND OBJECTIVE BOX
OTOLARYNGOLOGY (ENT) PROGRESS NOTE    PATIENT: KIN ALEXANDER  MRN: [id number]  : [birth year]  [id number]  DATE OF SERVICE:  24    Subjective/ Interval:   8/3: Seen and examined at bedside. AFVSS. Put on course of vancomycin and ceftriaxone after discussion with ID. Abscess cx with no growth, WBC, ESR, and CRP wnl.    ALLERGIES:  No Known Allergies      MEDICATIONS:  Antiinfectives:   cefTRIAXone   IVPB 2000 milliGRAM(s) IV Intermittent every 24 hours  vancomycin  IVPB 750 milliGRAM(s) IV Intermittent every 12 hours  vancomycin  IVPB 750 milliGRAM(s) IV Intermittent once    IV fluids:    Hematologic/Anticoagulation:  heparin   Injectable 5000 Unit(s) SubCutaneous every 12 hours    Pain medications/Neuro:  acetaminophen     Tablet .. 650 milliGRAM(s) Oral every 6 hours PRN  HYDROmorphone  Injectable 0.5 milliGRAM(s) IV Push every 15 minutes  ondansetron Injectable 4 milliGRAM(s) IV Push once PRN  oxycodone    5 mG/acetaminophen 325 mG 1 Tablet(s) Oral every 6 hours PRN    Endocrine Medications:     All other standing medications:     All other PRN medications:    Vital Signs Last 24 Hrs  T(C): 36.9 (03 Aug 2024 08:30), Max: 37.2 (02 Aug 2024 13:15)  T(F): 98.5 (03 Aug 2024 08:30), Max: 99 (02 Aug 2024 13:15)  HR: 51 (03 Aug 2024 08:30) (45 - 64)  BP: 90/52 (03 Aug 2024 08:30) (88/52 - 103/59)  BP(mean): 65 (02 Aug 2024 12:03) (65 - 74)  RR: 18 (03 Aug 2024 08:30) (8 - 19)  SpO2: 98% (03 Aug 2024 08:30) (95% - 99%)    Parameters below as of 03 Aug 2024 08:30  Patient On (Oxygen Delivery Method): room air           @ 07:01  -   @ 07:00  --------------------------------------------------------  IN:    IV PiggyBack: 50 mL    Lactated Ringers: 525 mL  Total IN: 575 mL    OUT:    Accordian (mL): 5 mL    Blood Loss (mL): 0 mL    Voided (mL): 2650 mL  Total OUT: 2655 mL    Total NET: -2080 mL          24 @ 07:01  -  24 @ 07:00  --------------------------------------------------------  IN:  Total IN: 0 mL    OUT:    Accordian (mL): 5 mL  Total OUT: 5 mL    Total NET: -5 mL              PHYSICAL EXAM:  Gen: NAD, resting comfortably in bed  Resp: On room air, nonlabored breathing, no respiratory distress  HEENT: L supraclavicular incision c/d/i, soft, no palpable hematoma. hemovac in place       LABS                       12.9   6.53  )-----------( 158      ( 03 Aug 2024 05:30 )             39.2    08-03    140  |  105  |  16  ----------------------------<  120<H>  3.8   |  26  |  0.85    Ca    8.7      03 Aug 2024 05:30  Phos  4.6     -  Mg     2.2         TPro  6.5  /  Alb  4.3  /  TBili  <0.2  /  DBili  x   /  AST  16  /  ALT  16  /  AlkPhos  63           Coagulation Studies-     Urinalysis Basic - ( 03 Aug 2024 05:30 )    Color: x / Appearance: x / SG: x / pH: x  Gluc: 120 mg/dL / Ketone: x  / Bili: x / Urobili: x   Blood: x / Protein: x / Nitrite: x   Leuk Esterase: x / RBC: x / WBC x   Sq Epi: x / Non Sq Epi: x / Bacteria: x      Endocrine Panel-  Calcium: 8.7 mg/dL ( @ 05:30)                MICROBIOLOGY:  Culture - Abscess with Gram Stain (collected 24 @ 09:32)  Source: .Abscess #3 Left supraclavicular abscess  Gram Stain (24 @ 22:45):    No polymorphonuclear cells seen per low power field    No organisms seen per oil power field    Culture - Abscess with Gram Stain (collected 24 @ 09:04)  Source: .Abscess Left Supraclavicular abscess #2  Gram Stain (24 @ 06:57):    No polymorphonuclear cells seen per low power field    No organisms seen per oil power field    Culture - Abscess with Gram Stain (collected 24 @ 09:04)  Source: .Abscess Left Supraclavicular abscess #1  Gram Stain (24 @ 22:46):    No polymorphonuclear cells seen per low power field    No organisms seen per oil power field          Culture - Abscess with Gram Stain (collected 24 @ 09:32)  Source: .Abscess #3 Left supraclavicular abscess  Gram Stain (24 @ 22:45):    No polymorphonuclear cells seen per low power field    No organisms seen per oil power field    Culture - Abscess with Gram Stain (collected 24 @ 09:04)  Source: .Abscess Left Supraclavicular abscess #2  Gram Stain (24 @ 06:57):    No polymorphonuclear cells seen per low power field    No organisms seen per oil power field    Culture - Abscess with Gram Stain (collected 24 @ 09:04)  Source: .Abscess Left Supraclavicular abscess #1  Gram Stain (24 @ 22:46):    No polymorphonuclear cells seen per low power field    No organisms seen per oil power field        RADIOLOGY & ADDITIONAL STUDIES:

## 2024-08-04 VITALS
TEMPERATURE: 98 F | OXYGEN SATURATION: 100 % | HEART RATE: 45 BPM | DIASTOLIC BLOOD PRESSURE: 63 MMHG | SYSTOLIC BLOOD PRESSURE: 100 MMHG | RESPIRATION RATE: 18 BRPM

## 2024-08-04 LAB
ALBUMIN SERPL ELPH-MCNC: 4.3 G/DL — SIGNIFICANT CHANGE UP (ref 3.3–5)
ALP SERPL-CCNC: 53 U/L — SIGNIFICANT CHANGE UP (ref 40–120)
ALT FLD-CCNC: 14 U/L — SIGNIFICANT CHANGE UP (ref 10–45)
ANION GAP SERPL CALC-SCNC: 7 MMOL/L — SIGNIFICANT CHANGE UP (ref 5–17)
AST SERPL-CCNC: 13 U/L — SIGNIFICANT CHANGE UP (ref 10–40)
BASOPHILS # BLD AUTO: 0.04 K/UL — SIGNIFICANT CHANGE UP (ref 0–0.2)
BASOPHILS NFR BLD AUTO: 0.8 % — SIGNIFICANT CHANGE UP (ref 0–2)
BILIRUB SERPL-MCNC: <0.2 MG/DL — SIGNIFICANT CHANGE UP (ref 0.2–1.2)
BUN SERPL-MCNC: 8 MG/DL — SIGNIFICANT CHANGE UP (ref 7–23)
CALCIUM SERPL-MCNC: 8.9 MG/DL — SIGNIFICANT CHANGE UP (ref 8.4–10.5)
CHLORIDE SERPL-SCNC: 106 MMOL/L — SIGNIFICANT CHANGE UP (ref 96–108)
CO2 SERPL-SCNC: 28 MMOL/L — SIGNIFICANT CHANGE UP (ref 22–31)
CREAT SERPL-MCNC: 0.71 MG/DL — SIGNIFICANT CHANGE UP (ref 0.5–1.3)
EGFR: 121 ML/MIN/1.73M2 — SIGNIFICANT CHANGE UP
EOSINOPHIL # BLD AUTO: 0.13 K/UL — SIGNIFICANT CHANGE UP (ref 0–0.5)
EOSINOPHIL NFR BLD AUTO: 2.7 % — SIGNIFICANT CHANGE UP (ref 0–6)
GI PCR PANEL: SIGNIFICANT CHANGE UP
GLUCOSE SERPL-MCNC: 88 MG/DL — SIGNIFICANT CHANGE UP (ref 70–99)
HCT VFR BLD CALC: 40.9 % — SIGNIFICANT CHANGE UP (ref 34.5–45)
HGB BLD-MCNC: 13.2 G/DL — SIGNIFICANT CHANGE UP (ref 11.5–15.5)
IMM GRANULOCYTES NFR BLD AUTO: 0.2 % — SIGNIFICANT CHANGE UP (ref 0–0.9)
LYMPHOCYTES # BLD AUTO: 1.6 K/UL — SIGNIFICANT CHANGE UP (ref 1–3.3)
LYMPHOCYTES # BLD AUTO: 33.6 % — SIGNIFICANT CHANGE UP (ref 13–44)
MAGNESIUM SERPL-MCNC: 1.9 MG/DL — SIGNIFICANT CHANGE UP (ref 1.6–2.6)
MCHC RBC-ENTMCNC: 31.4 PG — SIGNIFICANT CHANGE UP (ref 27–34)
MCHC RBC-ENTMCNC: 32.3 GM/DL — SIGNIFICANT CHANGE UP (ref 32–36)
MCV RBC AUTO: 97.4 FL — SIGNIFICANT CHANGE UP (ref 80–100)
MONOCYTES # BLD AUTO: 0.31 K/UL — SIGNIFICANT CHANGE UP (ref 0–0.9)
MONOCYTES NFR BLD AUTO: 6.5 % — SIGNIFICANT CHANGE UP (ref 2–14)
NEUTROPHILS # BLD AUTO: 2.67 K/UL — SIGNIFICANT CHANGE UP (ref 1.8–7.4)
NEUTROPHILS NFR BLD AUTO: 56.2 % — SIGNIFICANT CHANGE UP (ref 43–77)
NRBC # BLD: 0 /100 WBCS — SIGNIFICANT CHANGE UP (ref 0–0)
PHOSPHATE SERPL-MCNC: 4.1 MG/DL — SIGNIFICANT CHANGE UP (ref 2.5–4.5)
PLATELET # BLD AUTO: 167 K/UL — SIGNIFICANT CHANGE UP (ref 150–400)
POTASSIUM SERPL-MCNC: 4.1 MMOL/L — SIGNIFICANT CHANGE UP (ref 3.5–5.3)
POTASSIUM SERPL-SCNC: 4.1 MMOL/L — SIGNIFICANT CHANGE UP (ref 3.5–5.3)
PROT SERPL-MCNC: 6.9 G/DL — SIGNIFICANT CHANGE UP (ref 6–8.3)
RBC # BLD: 4.2 M/UL — SIGNIFICANT CHANGE UP (ref 3.8–5.2)
RBC # FLD: 11.5 % — SIGNIFICANT CHANGE UP (ref 10.3–14.5)
SODIUM SERPL-SCNC: 141 MMOL/L — SIGNIFICANT CHANGE UP (ref 135–145)
WBC # BLD: 4.76 K/UL — SIGNIFICANT CHANGE UP (ref 3.8–10.5)
WBC # FLD AUTO: 4.76 K/UL — SIGNIFICANT CHANGE UP (ref 3.8–10.5)

## 2024-08-04 PROCEDURE — 87040 BLOOD CULTURE FOR BACTERIA: CPT

## 2024-08-04 PROCEDURE — 85652 RBC SED RATE AUTOMATED: CPT

## 2024-08-04 PROCEDURE — 85025 COMPLETE CBC W/AUTO DIFF WBC: CPT

## 2024-08-04 PROCEDURE — C1889: CPT

## 2024-08-04 PROCEDURE — 87507 IADNA-DNA/RNA PROBE TQ 12-25: CPT

## 2024-08-04 PROCEDURE — 70542 MRI ORBIT/FACE/NECK W/DYE: CPT | Mod: MC

## 2024-08-04 PROCEDURE — 87077 CULTURE AEROBIC IDENTIFY: CPT

## 2024-08-04 PROCEDURE — 86140 C-REACTIVE PROTEIN: CPT

## 2024-08-04 PROCEDURE — 87205 SMEAR GRAM STAIN: CPT

## 2024-08-04 PROCEDURE — 70542 MRI ORBIT/FACE/NECK W/DYE: CPT | Mod: 26

## 2024-08-04 PROCEDURE — 87075 CULTR BACTERIA EXCEPT BLOOD: CPT

## 2024-08-04 PROCEDURE — 36415 COLL VENOUS BLD VENIPUNCTURE: CPT

## 2024-08-04 PROCEDURE — 86850 RBC ANTIBODY SCREEN: CPT

## 2024-08-04 PROCEDURE — 86901 BLOOD TYPING SEROLOGIC RH(D): CPT

## 2024-08-04 PROCEDURE — 83735 ASSAY OF MAGNESIUM: CPT

## 2024-08-04 PROCEDURE — 86900 BLOOD TYPING SEROLOGIC ABO: CPT

## 2024-08-04 PROCEDURE — 80053 COMPREHEN METABOLIC PANEL: CPT

## 2024-08-04 PROCEDURE — 87070 CULTURE OTHR SPECIMN AEROBIC: CPT

## 2024-08-04 PROCEDURE — 84100 ASSAY OF PHOSPHORUS: CPT

## 2024-08-04 RX ORDER — SULFAMETHOXAZOLE AND TRIMETHOPRIM 400; 80 MG/1; MG/1
2 TABLET ORAL
Qty: 40 | Refills: 0
Start: 2024-08-04 | End: 2024-08-13

## 2024-08-04 RX ORDER — APIXABAN 5 MG/1
1 TABLET, FILM COATED ORAL
Refills: 0 | DISCHARGE

## 2024-08-04 RX ORDER — ASPIRIN 500 MG
325 TABLET ORAL DAILY
Refills: 0 | Status: DISCONTINUED | OUTPATIENT
Start: 2024-08-04 | End: 2024-08-04

## 2024-08-04 RX ORDER — CEPHALEXIN 250 MG
1 CAPSULE ORAL
Qty: 40 | Refills: 0
Start: 2024-08-04 | End: 2024-08-13

## 2024-08-04 RX ORDER — SENNOSIDES 8.6 MG/1
1 TABLET ORAL DAILY
Refills: 0 | Status: DISCONTINUED | OUTPATIENT
Start: 2024-08-04 | End: 2024-08-04

## 2024-08-04 RX ADMIN — VANCOMYCIN HYDROCHLORIDE 250 MILLIGRAM(S): 5 INJECTION, POWDER, LYOPHILIZED, FOR SOLUTION INTRAVENOUS at 05:32

## 2024-08-04 RX ADMIN — Medication 325 MILLIGRAM(S): at 11:16

## 2024-08-04 RX ADMIN — Medication 100 MILLIGRAM(S): at 06:43

## 2024-08-04 RX ADMIN — HEPARIN SODIUM 5000 UNIT(S): 1000 INJECTION, SOLUTION INTRAVENOUS; SUBCUTANEOUS at 06:43

## 2024-08-04 RX ADMIN — SENNOSIDES 1 TABLET(S): 8.6 TABLET ORAL at 09:24

## 2024-08-04 NOTE — DISCHARGE NOTE PROVIDER - HOSPITAL COURSE
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 Intraop, abscess visualized next to hardware, no obvious bone infection. Intraoperative abscess cultures with NGTD. WBC, ESR, CRP wnl. Blood cx pending. Obtained repeat MRI neck per ID recommendations 8/4. Patient eligible for discharge on 8/4. Discussed discharge and return precautions - patient and mother verbalized understanding.

## 2024-08-04 NOTE — PROGRESS NOTE ADULT - SUBJECTIVE AND OBJECTIVE BOX
OTOLARYNGOLOGY (ENT) PROGRESS NOTE    PATIENT: KIN ALEXANDER  MRN: [id number]  : [birth year]  [id number]  DATE OF SERVICE:  24  			         Subjective/ Interval:   8/3: Seen and examined at bedside. AFVSS. Put on course of vancomycin and ceftriaxone after discussion with ID. Abscess cx with no growth, WBC, ESR, and CRP wnl.  : Seen and examined at bedside. AFVSS. Continuing with course of vancomycin and ceftriaxone. Abscess cx with no growth, WBC nml, blood cx pending. Seen by ID yesterday who recommended MRI neck w/ contrast.    ALLERGIES:  No Known Allergies      MEDICATIONS:  Antiinfectives:   cefTRIAXone   IVPB 2000 milliGRAM(s) IV Intermittent every 24 hours  vancomycin  IVPB 750 milliGRAM(s) IV Intermittent every 12 hours    IV fluids:    Hematologic/Anticoagulation:  heparin   Injectable 5000 Unit(s) SubCutaneous every 12 hours    Pain medications/Neuro:  acetaminophen     Tablet .. 650 milliGRAM(s) Oral every 6 hours PRN  aspirin 325 milliGRAM(s) Oral daily  HYDROmorphone  Injectable 0.5 milliGRAM(s) IV Push every 15 minutes  ondansetron Injectable 4 milliGRAM(s) IV Push once PRN  oxycodone    5 mG/acetaminophen 325 mG 1 Tablet(s) Oral every 6 hours PRN    Endocrine Medications:     All other standing medications:     All other PRN medications:  senna 1 Tablet(s) Oral daily PRN    Vital Signs Last 24 Hrs  T(C): 37 (04 Aug 2024 08:20), Max: 37.1 (03 Aug 2024 17:00)  T(F): 98.6 (04 Aug 2024 08:20), Max: 98.7 (03 Aug 2024 17:00)  HR: 57 (04 Aug 2024 08:20) (44 - 57)  BP: 111/67 (04 Aug 2024 08:20) (98/57 - 111/67)  BP(mean): --  RR: 18 (04 Aug 2024 08:20) (17 - 18)  SpO2: 98% (04 Aug 2024 08:20) (95% - 100%)    Parameters below as of 04 Aug 2024 08:20  Patient On (Oxygen Delivery Method): room air          08-03 @ 07:01  -  08- @ 07:00  --------------------------------------------------------  IN:    IV PiggyBack: 250 mL    IV PiggyBack: 300 mL  Total IN: 550 mL    OUT:    Accordian (mL): 12 mL    Voided (mL): 2500 mL  Total OUT: 2512 mL    Total NET: -2 mL          24 @ 07:01  -  24 @ 07:00  --------------------------------------------------------  IN:  Total IN: 0 mL    OUT:    Accordian (mL): 12 mL  Total OUT: 12 mL    Total NET: -12 mL              PHYSICAL EXAM:  en: NAD, resting comfortably in bed  Resp: On room air, nonlabored breathing, no respiratory distress  HEENT: L supraclavicular incision c/d/i, soft, no palpable hematoma. hemovac in place with minimal SS drainage          LABS                       12.9   6.53  )-----------( 158      ( 03 Aug 2024 05:30 )             39.2    08-03    140  |  105  |  16  ----------------------------<  120<H>  3.8   |  26  |  0.85    Ca    8.7      03 Aug 2024 05:30  Phos  4.6     08-  Mg     2.2     -    TPro  6.5  /  Alb  4.3  /  TBili  <0.2  /  DBili  x   /  AST  16  /  ALT  16  /  AlkPhos  63  0803         Coagulation Studies-     Urinalysis Basic - ( 03 Aug 2024 05:30 )    Color: x / Appearance: x / SG: x / pH: x  Gluc: 120 mg/dL / Ketone: x  / Bili: x / Urobili: x   Blood: x / Protein: x / Nitrite: x   Leuk Esterase: x / RBC: x / WBC x   Sq Epi: x / Non Sq Epi: x / Bacteria: x      Endocrine Panel-                MICROBIOLOGY:  Culture - Abscess with Gram Stain (collected 24 @ 09:32)  Source: .Abscess #3 Left supraclavicular abscess  Gram Stain (24 @ 22:45):    No polymorphonuclear cells seen per low power field    No organisms seen per oil power field  Preliminary Report (24 @ 16:31):    No growth to date.    Culture - Abscess with Gram Stain (collected 24 @ 09:04)  Source: .Abscess Left Supraclavicular abscess #2  Gram Stain (24 @ 06:57):    No polymorphonuclear cells seen per low power field    No organisms seen per oil power field  Preliminary Report (24 @ 19:19):    No growth to date.    Culture - Abscess with Gram Stain (collected 24 @ 09:04)  Source: .Abscess Left Supraclavicular abscess #1  Gram Stain (24 @ 22:46):    No polymorphonuclear cells seen per low power field    No organisms seen per oil power field  Preliminary Report (24 @ 16:37):    No growth to date.      Culture Results:   No growth to date. (24 @ 09:32)  Culture Results:   No growth to date. (24 @ 09:04)  Culture Results:   No growth to date. (24 @ 09:04)      Culture - Abscess with Gram Stain (collected 24 @ 09:32)  Source: .Abscess #3 Left supraclavicular abscess  Gram Stain (24 @ 22:45):    No polymorphonuclear cells seen per low power field    No organisms seen per oil power field  Preliminary Report (24 @ 16:31):    No growth to date.    Culture - Abscess with Gram Stain (collected 24 @ 09:04)  Source: .Abscess Left Supraclavicular abscess #2  Gram Stain (24 @ 06:57):    No polymorphonuclear cells seen per low power field    No organisms seen per oil power field  Preliminary Report (24 @ 19:19):    No growth to date.    Culture - Abscess with Gram Stain (collected 24 @ 09:04)  Source: .Abscess Left Supraclavicular abscess #1  Gram Stain (24 @ 22:46):    No polymorphonuclear cells seen per low power field    No organisms seen per oil power field  Preliminary Report (24 @ 16:37):    No growth to date.

## 2024-08-04 NOTE — DISCHARGE NOTE PROVIDER - CARE PROVIDERS DIRECT ADDRESSES
[email or web address]

## 2024-08-04 NOTE — DISCHARGE NOTE PROVIDER - CARE PROVIDER_API CALL
Giovani Basilio  Craniofacial Surgery  [address] [facility], [address]  [place] [address]  Phone: [phone]  Fax: [phone]  Established Patient  Scheduled Appointment: 08/05/2024 12:00 PM    Torie Greer  Infectious Disease  [address] [place] [address], [address]  [place] [address]  Phone: [phone]  Fax: [phone]  Scheduled Appointment: 08/15/2024

## 2024-08-04 NOTE — DISCHARGE NOTE NURSING/CASE MANAGEMENT/SOCIAL WORK - PATIENT PORTAL LINK FT
You can access the FollowMyHealth Patient Portal offered by [facility] by registering at the following website: [email or web address]. By joining [employer]’s FollowMyHealth portal, you will also be able to view your health information using other applications (apps) compatible with our system.

## 2024-08-04 NOTE — PROGRESS NOTE ADULT - ASSESSMENT
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 Intraop, abscess visualized next to hardware, no obvious bone infection. Intraoperative abscess cultures with NGTD. WBC, ESR, CRP wnl. Blood cx pending. Pending repeat MRI neck per ID recommendations.    PLAN:  - C/w tele  - c/w vancomycin and ceftriaxone per ID recs  - MRI neck w/ contrast  - will consider PICC line placement pending repeat MRI neck and discussion with ID  - trend WBC, fever curve  - f/u blood cx  - f/u OR abscess cx   - maintain hemovac drain, will remove during postoperative follow up appointment with Dr. Basilio  - pain control  - regular diet  - patient takes eliquis at home for chronic basilic vein SVT; will restart once hemovac drain is removed. In the meantime, 325 mg aspirin started morning of 8/4  - patient will have postoperative follow up with Dr. Basilio in [place] office at noon: [address] [facility] , 4th floor, [place]

## 2024-08-04 NOTE — DISCHARGE NOTE PROVIDER - NSDCCPTREATMENT_GEN_ALL_CORE_FT
PRINCIPAL PROCEDURE  Procedure: Incision and drainage, deep abscess, neck  Findings and Treatment:

## 2024-08-04 NOTE — DISCHARGE NOTE NURSING/CASE MANAGEMENT/SOCIAL WORK - NSDCPEFALRISK_GEN_ALL_CORE
For information on Fall & Injury Prevention, visit: [email or web address] OR  [email or web address] OR  https://www.cdc.gov/steadi/patient.html

## 2024-08-04 NOTE — DISCHARGE NOTE PROVIDER - NSDCFUADDAPPT_GEN_ALL_CORE_FT
Please call both Dr. Basilio and Dr. Greer clinic's to confirm appointments. Dr Basilio postoperative follow up appointment will be Monday 8/5/24. Dr. Greer Infectious Disease appointment will be in 10 days.

## 2024-08-04 NOTE — DISCHARGE NOTE PROVIDER - NSDCMRMEDTOKEN_GEN_ALL_CORE_FT
Bactrim 400 mg-80 mg oral tablet: 2 tab(s) orally every 12 hours  cephalexin 750 mg oral capsule: 1 cap(s) orally every 6 hours  Eliquis 2.5 mg oral tablet: 1 tab(s) orally 2 times a day

## 2024-08-04 NOTE — DISCHARGE NOTE PROVIDER - PROVIDER TOKENS
PROVIDER:[TOKEN:[[id number]],SCHEDULEDAPPT:[08/05/2024],SCHEDULEDAPPTTIME:[12:00 PM],ESTABLISHEDPATIENT:[T]],PROVIDER:[TOKEN:[[id number]],SCHEDULEDAPPT:[08/15/2024]]

## 2024-08-05 LAB — GRAM STN FLD: ABNORMAL

## 2024-08-06 PROBLEM — L02.91 ABSCESS: Status: ACTIVE | Noted: 2024-08-06

## 2024-08-07 PROBLEM — I82.90 ACUTE EMBOLISM AND THROMBOSIS OF UNSPECIFIED VEIN: Chronic | Status: ACTIVE | Noted: 2024-08-01

## 2024-08-07 PROBLEM — C73 MALIGNANT NEOPLASM OF THYROID GLAND: Chronic | Status: ACTIVE | Noted: 2024-08-01

## 2024-08-07 LAB
CULTURE RESULTS: ABNORMAL
GRAM STN FLD: ABNORMAL
GRAM STN FLD: ABNORMAL
SPECIMEN SOURCE: SIGNIFICANT CHANGE UP
SURGICAL PATHOLOGY STUDY: SIGNIFICANT CHANGE UP

## 2024-08-08 LAB
CULTURE RESULTS: SIGNIFICANT CHANGE UP
CULTURE RESULTS: SIGNIFICANT CHANGE UP
SPECIMEN SOURCE: SIGNIFICANT CHANGE UP
SPECIMEN SOURCE: SIGNIFICANT CHANGE UP

## 2024-08-19 ENCOUNTER — APPOINTMENT (OUTPATIENT)
Dept: INFECTIOUS DISEASE | Facility: CLINIC | Age: 24
End: 2024-08-19
Payer: COMMERCIAL

## 2024-08-19 VITALS
WEIGHT: 101 LBS | OXYGEN SATURATION: 97 % | HEIGHT: 65 IN | TEMPERATURE: 98 F | SYSTOLIC BLOOD PRESSURE: 103 MMHG | DIASTOLIC BLOOD PRESSURE: 69 MMHG | BODY MASS INDEX: 16.83 KG/M2 | HEART RATE: 55 BPM

## 2024-08-19 DIAGNOSIS — Z83.49 FAMILY HISTORY OF OTHER ENDOCRINE, NUTRITIONAL AND METABOLIC DISEASES: ICD-10-CM

## 2024-08-19 DIAGNOSIS — F07.81 POSTCONCUSSIONAL SYNDROME: ICD-10-CM

## 2024-08-19 DIAGNOSIS — Z82.49 FAMILY HISTORY OF ISCHEMIC HEART DISEASE AND OTHER DISEASES OF THE CIRCULATORY SYSTEM: ICD-10-CM

## 2024-08-19 DIAGNOSIS — Z81.1 FAMILY HISTORY OF ALCOHOL ABUSE AND DEPENDENCE: ICD-10-CM

## 2024-08-19 DIAGNOSIS — L02.91 CUTANEOUS ABSCESS, UNSPECIFIED: ICD-10-CM

## 2024-08-19 DIAGNOSIS — Z83.3 FAMILY HISTORY OF DIABETES MELLITUS: ICD-10-CM

## 2024-08-19 PROCEDURE — 99205 OFFICE O/P NEW HI 60 MIN: CPT

## 2024-08-19 NOTE — ASSESSMENT
[Treatment Education] : treatment education [Treatment Adherence] : treatment adherence [Rx Dose / Side Effects] : Rx dose/side effects [Drug Interactions / Side Effects] : drug interactions/side effects [Disclosure of Diagnosis] : disclosure of diagnosis [Anticipatory Guidance] : anticipatory guidance [FreeTextEntry1] : C acnes abscess abutting L clavicle hardware s/p hardware explantation - Now p/w fever and L SCM TTP along with localized point tenderness over L clavicle - Will treat w IV Ceftriaxone 2 gm q24h x 4 wk - IR PICC placement - Needs infusion services - Check CBC w diff, CMP, ESR, CRP, BCx - F/u 1 -2 wk

## 2024-08-19 NOTE — PHYSICAL EXAM
[General Appearance - Alert] : alert [General Appearance - In No Acute Distress] : in no acute distress [Sclera] : the sclera and conjunctiva were normal [PERRL With Normal Accommodation] : pupils were equal in size, round, reactive to light [Extraocular Movements] : extraocular movements were intact [Outer Ear] : the ears and nose were normal in appearance [Hearing Threshold Finger Rub Not [place]] : hearing was normal [Examination Of The Oral Cavity] : the lips and gums were normal [Neck Appearance] : the appearance of the neck was normal [] : no respiratory distress [Respiration, Rhythm And Depth] : normal respiratory rhythm and effort [Exaggerated Use Of Accessory Muscles For Inspiration] : no accessory muscle use [Auscultation Breath Sounds / Voice Sounds] : lungs were clear to auscultation bilaterally [Heart Rate And Rhythm] : heart rate was normal and rhythm regular [Heart Sounds] : normal S1 and S2 [Murmurs] : no murmurs [Edema] : there was no peripheral edema [Bowel Sounds] : normal bowel sounds [Abdomen Soft] : soft [Abdomen Tenderness] : non-tender [No Palpable Adenopathy] : no palpable adenopathy [Musculoskeletal - Swelling] : no joint swelling [Range of Motion to Joints] : range of motion to joints [Nail Clubbing] : no clubbing  or cyanosis of the fingernails [Motor Tone] : muscle strength and tone were normal [Skin Color & Pigmentation] : normal skin color and pigmentation [Cranial Nerves] : cranial nerves 2-12 were intact [No Focal Deficits] : no focal deficits [Oriented To Time, Place, And Person] : oriented to person, place, and time [Affect] : the affect was normal [FreeTextEntry1] : L thigh acne

## 2024-08-19 NOTE — HISTORY OF PRESENT ILLNESS
[FreeTextEntry1] : 24F w pmhx eating d/o (anorexia), L clavicle fx s/p ORIF w hardware 9 yr ago ([place] injury) and L basilic vein SVT on Eliquis was found to have a L supraclavicular mobile nontender lump which was explored in OR. Per ENT, purulent material noted in OR. OR cx grew CoNS and C acnes.  D/w Dr. Garcia, hardware explanted in OR and no c/f bone involvement.  ESR and CRP were WNL (ESR=5, CRP<3), WBC WNL. 8/3 BCx neg.  Was placed on IV Vancomycin and CTX while at [facility].  8/4 MR neck showed No drainable fluid collections, increased T2/STIR hyperintense signal and enhancement within the left supraclavicular soft tissues; read as most likely represents postoperative changes, superimposed infection cannot be excluded. She was dc'ed on PO TMP-SMX DS q12h and PO Cephalexin 500 mg q6h x 7 days. At the time, she was also concerned about intolerance to IV Abx given her ongoing recovery from the eating disorder.  Now off Abx > 1 week and c/o L clavicular and L SCM tenderness, fever 100.5-101F 2ce last week and occasional loose stools.  Denies other somatic complaints.

## 2024-08-19 NOTE — REVIEW OF SYSTEMS
[Fever] : fever [As Noted in HPI] : as noted in HPI [Negative] : Heme/Lymph [Chills] : no chills [Body Aches] : no body aches [de-identified] : SSI well approximated

## 2024-08-21 LAB
ALBUMIN SERPL ELPH-MCNC: 5.2 G/DL
ALP BLD-CCNC: 64 U/L
ALT SERPL-CCNC: 28 U/L
ANION GAP SERPL CALC-SCNC: 13 MMOL/L
AST SERPL-CCNC: 26 U/L
BASOPHILS # BLD AUTO: 0.06 K/UL
BASOPHILS NFR BLD AUTO: 1.6 %
BILIRUB SERPL-MCNC: 0.4 MG/DL
BUN SERPL-MCNC: 12 MG/DL
CALCIUM SERPL-MCNC: 9.7 MG/DL
CHLORIDE SERPL-SCNC: 102 MMOL/L
CO2 SERPL-SCNC: 28 MMOL/L
CREAT SERPL-MCNC: 0.82 MG/DL
CRP SERPL-MCNC: <3 MG/L
EGFR: 102 ML/MIN/1.73M2
EOSINOPHIL # BLD AUTO: 0.02 K/UL
EOSINOPHIL NFR BLD AUTO: 0.5 %
ERYTHROCYTE [SEDIMENTATION RATE] IN BLOOD BY WESTERGREN METHOD: 2 MM/HR
GLUCOSE SERPL-MCNC: 39 MG/DL
HCT VFR BLD CALC: 42.9 %
HGB BLD-MCNC: 13.7 G/DL
IMM GRANULOCYTES NFR BLD AUTO: 0 %
LYMPHOCYTES # BLD AUTO: 1.22 K/UL
LYMPHOCYTES NFR BLD AUTO: 32.1 %
MAN DIFF?: NORMAL
MCHC RBC-ENTMCNC: 31.9 GM/DL
MCHC RBC-ENTMCNC: 32 PG
MCV RBC AUTO: 100.2 FL
MONOCYTES # BLD AUTO: 0.27 K/UL
MONOCYTES NFR BLD AUTO: 7.1 %
NEUTROPHILS # BLD AUTO: 2.23 K/UL
NEUTROPHILS NFR BLD AUTO: 58.7 %
PLATELET # BLD AUTO: 206 K/UL
POTASSIUM SERPL-SCNC: 3.7 MMOL/L
PROT SERPL-MCNC: 7.5 G/DL
RBC # BLD: 4.28 M/UL
RBC # FLD: 11.9 %
SODIUM SERPL-SCNC: 143 MMOL/L
WBC # FLD AUTO: 3.8 K/UL

## 2024-08-22 ENCOUNTER — RESULT REVIEW (OUTPATIENT)
Age: 24
End: 2024-08-22

## 2024-08-22 ENCOUNTER — OUTPATIENT (OUTPATIENT)
Dept: OUTPATIENT SERVICES | Facility: HOSPITAL | Age: 24
LOS: 1 days | End: 2024-08-22
Payer: COMMERCIAL

## 2024-08-22 DIAGNOSIS — E89.0 POSTPROCEDURAL HYPOTHYROIDISM: Chronic | ICD-10-CM

## 2024-08-22 DIAGNOSIS — Z98.890 OTHER SPECIFIED POSTPROCEDURAL STATES: Chronic | ICD-10-CM

## 2024-08-22 PROCEDURE — 36573 INSJ PICC RS&I 5 YR+: CPT

## 2024-08-22 PROCEDURE — C1751: CPT

## 2024-08-26 LAB — BACTERIA BLD CULT: NORMAL

## 2024-08-28 ENCOUNTER — OUTPATIENT (OUTPATIENT)
Dept: OUTPATIENT SERVICES | Facility: HOSPITAL | Age: 24
LOS: 1 days | End: 2024-08-28
Payer: COMMERCIAL

## 2024-08-28 DIAGNOSIS — E89.0 POSTPROCEDURAL HYPOTHYROIDISM: Chronic | ICD-10-CM

## 2024-08-28 DIAGNOSIS — Z98.890 OTHER SPECIFIED POSTPROCEDURAL STATES: Chronic | ICD-10-CM

## 2024-08-28 PROCEDURE — 71045 X-RAY EXAM CHEST 1 VIEW: CPT | Mod: 26

## 2024-08-28 PROCEDURE — 71045 X-RAY EXAM CHEST 1 VIEW: CPT

## 2024-08-29 ENCOUNTER — APPOINTMENT (OUTPATIENT)
Dept: INTERVENTIONAL RADIOLOGY/VASCULAR | Facility: HOSPITAL | Age: 24
End: 2024-08-29

## 2024-08-29 ENCOUNTER — RESULT REVIEW (OUTPATIENT)
Age: 24
End: 2024-08-29

## 2024-08-29 ENCOUNTER — OUTPATIENT (OUTPATIENT)
Dept: OUTPATIENT SERVICES | Facility: HOSPITAL | Age: 24
LOS: 1 days | End: 2024-08-29
Payer: COMMERCIAL

## 2024-08-29 DIAGNOSIS — Z98.890 OTHER SPECIFIED POSTPROCEDURAL STATES: Chronic | ICD-10-CM

## 2024-08-29 DIAGNOSIS — E89.0 POSTPROCEDURAL HYPOTHYROIDISM: Chronic | ICD-10-CM

## 2024-08-29 PROCEDURE — C1751: CPT

## 2024-08-29 PROCEDURE — 36573 INSJ PICC RS&I 5 YR+: CPT

## 2024-09-03 ENCOUNTER — APPOINTMENT (OUTPATIENT)
Dept: VASCULAR SURGERY | Facility: CLINIC | Age: 24
End: 2024-09-03
Payer: COMMERCIAL

## 2024-09-03 VITALS
SYSTOLIC BLOOD PRESSURE: 118 MMHG | HEIGHT: 65 IN | WEIGHT: 101 LBS | BODY MASS INDEX: 16.83 KG/M2 | DIASTOLIC BLOOD PRESSURE: 70 MMHG | HEART RATE: 68 BPM

## 2024-09-03 PROCEDURE — 99024 POSTOP FOLLOW-UP VISIT: CPT

## 2024-09-04 NOTE — DATA REVIEWED
[FreeTextEntry1] : MRA at R reviewed-no obvious L SCV thrombus identified, area obscured by clavicular hardware.  Previous LUE venous duplex performed to evaluate for LUE/SCV thrombus notes chronic SVT of the L basilic vein, no definitive evidence of LUE or SCV DVT noted, complex fluid-filled structure noted in the L supraclavicular space.  In resting, sitting position, there is stenosis noted at the pSCV suggestive of a possible extra-luminar compression, w/arm abducted to 90/180deg, there is no evidence of stenosis.

## 2024-09-04 NOTE — PHYSICAL EXAM
[JVD] : no jugular venous distention  [Normal Breath Sounds] : Normal breath sounds [Respiratory Effort] : normal respiratory effort [Normal Heart Sounds] : normal heart sounds [Normal Rate and Rhythm] : normal rate and rhythm [2+] : left 2+ [No Rash or Lesion] : No rash or lesion [Purpura] : no purpura  [Petechiae] : no petechiae [Skin Ulcer] : no ulcer [Skin Induration] : no induration [Alert] : alert [Anxious] : anxious [de-identified] : Healthy appearance, thin/athletic habitus [de-identified] : NC/AT, anicteric [de-identified] : L supraclavicular operative site well-healed [de-identified] : Improved AROM at the L shoulder [de-identified] : Neurosensory grossly intact in UEs b/l

## 2024-09-04 NOTE — ASSESSMENT
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 Basilio 1mo prior.  Pt reports some operative site discomfort but notes improved strength and ROM at the shoulder, denies any new issues at this time.  She presents to us today for routine exam.  Symptoms and ROM improved since her surgery 1mo prior, no evidence of infection or new issues noted.  Recommend pt f/u w/Chetna and consider PT if necessary.  She will RTO for new symptoms in the arm so that we may reassess w/duplex.

## 2024-09-04 NOTE — HISTORY OF PRESENT ILLNESS
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 Basilio 1mo prior.  Pt reports some operative site discomfort but notes improved strength and ROM at the shoulder, denies any new issues at this time.  She presents to us today for routine exam.

## 2024-09-04 NOTE — ADDENDUM
[FreeTextEntry1] : This note was written by Monty Smith, acting as a scribe for Dr. Rhina Bhatia.  I, Dr. Rhina Bhatia, have read and attest that all the information, medical decision-making, and discharge instructions within are true and accurate.  I, Dr. Rhina Bhatia, personally performed the evaluation and management (E/M) services for this established patient who presents today with (an) existing condition(s).  That E/M includes conducting the examination, assessing all conditions, and (re)establishing/reinforcing a plan of care.  Today, my ACP, Monty Smith, was here to observe my evaluation and management services for this condition to be followed going forward.

## 2024-09-10 ENCOUNTER — APPOINTMENT (OUTPATIENT)
Dept: INFECTIOUS DISEASE | Facility: CLINIC | Age: 24
End: 2024-09-10
Payer: COMMERCIAL

## 2024-09-10 VITALS
OXYGEN SATURATION: 99 % | TEMPERATURE: 97.7 F | SYSTOLIC BLOOD PRESSURE: 105 MMHG | DIASTOLIC BLOOD PRESSURE: 71 MMHG | BODY MASS INDEX: 16.5 KG/M2 | HEART RATE: 58 BPM | WEIGHT: 99 LBS | HEIGHT: 65 IN

## 2024-09-10 DIAGNOSIS — Z78.9 OTHER SPECIFIED HEALTH STATUS: ICD-10-CM

## 2024-09-10 DIAGNOSIS — Z83.3 FAMILY HISTORY OF DIABETES MELLITUS: ICD-10-CM

## 2024-09-10 DIAGNOSIS — Z82.49 FAMILY HISTORY OF ISCHEMIC HEART DISEASE AND OTHER DISEASES OF THE CIRCULATORY SYSTEM: ICD-10-CM

## 2024-09-10 DIAGNOSIS — F07.81 POSTCONCUSSIONAL SYNDROME: ICD-10-CM

## 2024-09-10 DIAGNOSIS — Z83.49 FAMILY HISTORY OF OTHER ENDOCRINE, NUTRITIONAL AND METABOLIC DISEASES: ICD-10-CM

## 2024-09-10 DIAGNOSIS — Z81.1 FAMILY HISTORY OF ALCOHOL ABUSE AND DEPENDENCE: ICD-10-CM

## 2024-09-10 DIAGNOSIS — L02.91 CUTANEOUS ABSCESS, UNSPECIFIED: ICD-10-CM

## 2024-09-10 PROCEDURE — 99214 OFFICE O/P EST MOD 30 MIN: CPT

## 2024-09-11 PROBLEM — F07.81 POST-CONCUSSION SYNDROME: Status: RESOLVED | Noted: 2021-04-21 | Resolved: 2024-09-11

## 2024-09-11 RX ORDER — CEFTRIAXONE 2 G/1
2 INJECTION, POWDER, FOR SOLUTION INTRAMUSCULAR; INTRAVENOUS
Refills: 0 | Status: ACTIVE | COMMUNITY

## 2024-09-11 NOTE — ASSESSMENT
[FreeTextEntry1] : C acnes abscess abutting L clavicle hardware s/p hardware explantation - On IV Ceftriaxone 2 gm q24h since 8/23/34 with planned EOT 9/19/24 - Needs CT neck prior to Abx cessation - Continue to check weekly OPAT labs incl. CBC w diff, CMP, ESR, CRP - F/u 1-2 wks - F/u with Dr. Frankel and Dr. Garcia  Fever, night sweats, amenorrhea, eating d/o in recovery - Cutaneous temp sensor readings 100.4F in am per pt  - WBC WNL, no localizing sps, L SCM was TTP at last office visit, nontender today - No other foci of acute infectious etiology - F/u with PCP, Endo, Rheum  [Treatment Education] : treatment education [Treatment Adherence] : treatment adherence [Rx Dose / Side Effects] : Rx dose/side effects [Drug Interactions / Side Effects] : drug interactions/side effects [Anticipatory Guidance] : anticipatory guidance

## 2024-09-11 NOTE — HISTORY OF PRESENT ILLNESS
[FreeTextEntry1] : 24F w pmhx eating d/o (anorexia), L clavicle fx s/p ORIF w hardware 9 yr ago ([place] injury) and L basilic vein SVT on Eliquis was found to have a L supraclavicular mobile nontender lump which was explored in OR. Per ENT, purulent material noted in OR. OR cx grew CoNS and C acnes. D/w Dr. Garcia, hardware explanted in OR and no c/f bone involvement. ESR and CRP were WNL (ESR=5, CRP<3), WBC WNL. 8/3 BCx neg. Was placed on IV Vancomycin and CTX while at [facility]. 8/4 MR neck showed No drainable fluid collections, increased T2/STIR hyperintense signal and enhancement within the left supraclavicular soft tissues; read as most likely represents postoperative changes, superimposed infection cannot be excluded. She was dc'ed on PO TMP-SMX DS q12h and PO Cephalexin 500 mg q6h x 7 days. At the time, she was also concerned about intolerance to IV Abx given her ongoing recovery from the eating disorder. Last seen by me in office on 8/19 when she was off Abx > 1 week and c/o L clavicular and L SCM tenderness, fever and occasional loose stool. Started IV CTX 2gm q24h on 8/23/24. RUE PICC was placed 8/22, removed d/t RUE discomfort; LUE PICC placed 8/29. Presents for office f/u today, c/o L SCM fullness, fever 100.4 in the am x 1 wk not a/w chills, c/o night sweats, undergoing rheum and endo w/u. Has been using cutaneous temp sensor to check temps as recc by Dr. Garcia. Appears well, no other somatic complaints.

## 2024-09-11 NOTE — HISTORY OF PRESENT ILLNESS
[FreeTextEntry1] : 24F w pmhx eating d/o (anorexia), L clavicle fx s/p ORIF w hardware 9 yr ago ([place] injury) and L basilic vein SVT on Eliquis was found to have a L supraclavicular mobile nontender lump which was explored in OR. Per ENT, purulent material noted in OR. OR cx grew CoNS and C acnes. D/w Dr. Garcia, hardware explanted in OR and no c/f bone involvement. ESR and CRP were WNL (ESR=5, CRP<3), WBC WNL. 8/3 BCx neg. Was placed on IV Vancomycin and CTX while at [facility]. 8/4 MR neck showed No drainable fluid collections, increased T2/STIR hyperintense signal and enhancement within the left supraclavicular soft tissues; read as most likely represents postoperative changes, superimposed infection cannot be excluded. She was dc'ed on PO TMP-SMX DS q12h and PO Cephalexin 500 mg q6h x 7 days. At the time, she was also concerned about intolerance to IV Abx given her ongoing recovery from the eating disorder. Last seen by me in office on 8/19 when she was off Abx > 1 week and c/o L clavicular and L SCM tenderness, fever and occasional loose stool. Started IV CTX 2gm q24h on 8/23/24. RUE PICC was placed 8/22, removed d/t RUE discomfort; LUE PICC placed 8/29. Presents for office f/u today, c/o L SCM fullness, fever 100.4 in the am x 1 wk not a/w chills, c/o night sweats, undergoing rheum and endo w/u. Has been using cutaneous temp sensor to check temps as recc by Dr. Garcia. Appears well, no other somatic complaints.

## 2024-09-11 NOTE — PHYSICAL EXAM
[General Appearance - Alert] : alert [General Appearance - In No Acute Distress] : in no acute distress [General Appearance - Well-Appearing] : healthy appearing [Sclera] : the sclera and conjunctiva were normal [PERRL With Normal Accommodation] : pupils were equal in size, round, reactive to light [Extraocular Movements] : extraocular movements were intact [Outer Ear] : the ears and nose were normal in appearance [Hearing Threshold Finger Rub Not [place]] : hearing was normal [Examination Of The Oral Cavity] : the lips and gums were normal [Neck Appearance] : the appearance of the neck was normal [Respiration, Rhythm And Depth] : normal respiratory rhythm and effort [Exaggerated Use Of Accessory Muscles For Inspiration] : no accessory muscle use [Auscultation Breath Sounds / Voice Sounds] : lungs were clear to auscultation bilaterally [Heart Rate And Rhythm] : heart rate was normal and rhythm regular [Heart Sounds] : normal S1 and S2 [Murmurs] : no murmurs [Edema] : there was no peripheral edema [Bowel Sounds] : normal bowel sounds [Abdomen Soft] : soft [Abdomen Tenderness] : non-tender [No Palpable Adenopathy] : no palpable adenopathy [Nail Clubbing] : no clubbing  or cyanosis of the fingernails [Skin Color & Pigmentation] : normal skin color and pigmentation [] : no rash [Cranial Nerves] : cranial nerves 2-12 were intact [No Focal Deficits] : no focal deficits [Oriented To Time, Place, And Person] : oriented to person, place, and time

## 2024-09-17 ENCOUNTER — OUTPATIENT (OUTPATIENT)
Dept: OUTPATIENT SERVICES | Facility: HOSPITAL | Age: 24
LOS: 1 days | End: 2024-09-17

## 2024-09-17 ENCOUNTER — APPOINTMENT (OUTPATIENT)
Dept: CT IMAGING | Facility: CLINIC | Age: 24
End: 2024-09-17
Payer: COMMERCIAL

## 2024-09-17 DIAGNOSIS — E89.0 POSTPROCEDURAL HYPOTHYROIDISM: Chronic | ICD-10-CM

## 2024-09-17 DIAGNOSIS — Z98.890 OTHER SPECIFIED POSTPROCEDURAL STATES: Chronic | ICD-10-CM

## 2024-09-17 PROCEDURE — 70491 CT SOFT TISSUE NECK W/DYE: CPT | Mod: 26

## 2024-09-19 ENCOUNTER — APPOINTMENT (OUTPATIENT)
Dept: INFECTIOUS DISEASE | Facility: CLINIC | Age: 24
End: 2024-09-19

## 2024-09-24 ENCOUNTER — OUTPATIENT (OUTPATIENT)
Dept: OUTPATIENT SERVICES | Facility: HOSPITAL | Age: 24
LOS: 1 days | End: 2024-09-24
Payer: COMMERCIAL

## 2024-09-24 ENCOUNTER — APPOINTMENT (OUTPATIENT)
Dept: VASCULAR SURGERY | Facility: CLINIC | Age: 24
End: 2024-09-24
Payer: COMMERCIAL

## 2024-09-24 VITALS
WEIGHT: 99 LBS | HEART RATE: 66 BPM | HEIGHT: 65 IN | BODY MASS INDEX: 16.5 KG/M2 | DIASTOLIC BLOOD PRESSURE: 63 MMHG | SYSTOLIC BLOOD PRESSURE: 99 MMHG

## 2024-09-24 DIAGNOSIS — E89.0 POSTPROCEDURAL HYPOTHYROIDISM: Chronic | ICD-10-CM

## 2024-09-24 DIAGNOSIS — I82.890 ACUTE EMBOLISM AND THROMBOSIS OF OTHER SPECIFIED VEINS: ICD-10-CM

## 2024-09-24 PROCEDURE — 99213 OFFICE O/P EST LOW 20 MIN: CPT

## 2024-09-24 PROCEDURE — 73000 X-RAY EXAM OF COLLAR BONE: CPT

## 2024-09-24 PROCEDURE — 73000 X-RAY EXAM OF COLLAR BONE: CPT | Mod: 26,LT

## 2024-09-24 PROCEDURE — 93971 EXTREMITY STUDY: CPT

## 2024-09-25 NOTE — ASSESSMENT
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 Basilio 3mo prior, still reporting some operative site discomfort and LUE swelling but notes improved strength and ROM at the shoulder.  She denies any new issues at this time.  Symptoms and ROM improved since her surgery 3mo prior, no evidence of infection or new issues noted.  LUE venous duplex performed to evaluate previous SVT demonstrates no fluid collection or previously seen SCV stenosis, chronic SVT of the L basilic vein noted.  Recommend pt continue PT to exercise the arm but she is cleared to d/c DOAC at this time.  She will RTO PRN.

## 2024-09-25 NOTE — HISTORY OF PRESENT ILLNESS
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 Basilio 3mo prior, still reporting some operative site discomfort and LUE swelling but notes improved strength and ROM at the shoulder.  She denies any new issues at this time.

## 2024-09-25 NOTE — PROCEDURE
[FreeTextEntry1] : LUE venous duplex performed to evaluate previous SVT demonstrates no fluid collection or previously seen SCV stenosis, chronic SVT of the L basilic vein noted.

## 2024-10-03 ENCOUNTER — APPOINTMENT (OUTPATIENT)
Dept: MRI IMAGING | Facility: CLINIC | Age: 24
End: 2024-10-03

## 2024-11-12 ENCOUNTER — OUTPATIENT (OUTPATIENT)
Dept: OUTPATIENT SERVICES | Facility: HOSPITAL | Age: 24
LOS: 1 days | End: 2024-11-12
Payer: COMMERCIAL

## 2024-11-12 DIAGNOSIS — E89.0 POSTPROCEDURAL HYPOTHYROIDISM: Chronic | ICD-10-CM

## 2024-11-12 DIAGNOSIS — Z98.890 OTHER SPECIFIED POSTPROCEDURAL STATES: Chronic | ICD-10-CM

## 2024-11-12 PROCEDURE — 72142 MRI NECK SPINE W/DYE: CPT | Mod: 26

## 2024-11-12 PROCEDURE — 72142 MRI NECK SPINE W/DYE: CPT

## 2024-11-12 PROCEDURE — A9585: CPT

## 2024-11-19 ENCOUNTER — APPOINTMENT (OUTPATIENT)
Dept: MRI IMAGING | Facility: CLINIC | Age: 24
End: 2024-11-19

## 2025-07-01 ENCOUNTER — APPOINTMENT (OUTPATIENT)
Dept: VASCULAR SURGERY | Facility: CLINIC | Age: 25
End: 2025-07-01

## 2025-07-31 NOTE — PATIENT PROFILE ADULT - DO YOU FEEL LIKE HURTING YOURSELF OR OTHERS?
7/3/2025    Aline Martell (:  1996) is a 29 y.o. female, Established patient, here for evaluation of the following chief complaint(s):  No chief complaint on file.      There were no vitals filed for this visit.      Wt Readings from Last 3 Encounters:   25 96.6 kg (213 lb)   06/10/25 98.4 kg (217 lb)   25 100.7 kg (222 lb)       BP Readings from Last 3 Encounters:   06/10/25 112/70   25 124/76   25 124/72         SUBJECTIVE/OBJECTIVE:    Patient seen today for her ears.  She says her sinus is doing better and her right ear pops so loudly somebody else hurt it but that ear is doing a little better now.  Left ear is still painful and full.        Review of Systems   Constitutional: Negative.    HENT:  Positive for ear pain.         Ear pressure   Eyes: Negative.    Respiratory: Negative.     Cardiovascular: Negative.    Gastrointestinal: Negative.    Endocrine: Negative.    Musculoskeletal: Negative.    Skin: Negative.    Allergic/Immunologic: Negative.    Neurological: Negative.    Hematological: Negative.    Psychiatric/Behavioral: Negative.          Physical Exam  Vitals reviewed.   Constitutional:       Appearance: Normal appearance. She is normal weight.   HENT:      Head: Normocephalic and atraumatic.      Right Ear: Tympanic membrane, ear canal and external ear normal.      Left Ear: Tympanic membrane, ear canal and external ear normal.      Nose: Nose normal.      Mouth/Throat:      Mouth: Mucous membranes are moist.      Pharynx: Oropharynx is clear.   Eyes:      Extraocular Movements: Extraocular movements intact.      Pupils: Pupils are equal, round, and reactive to light.   Cardiovascular:      Rate and Rhythm: Normal rate and regular rhythm.   Pulmonary:      Effort: Pulmonary effort is normal.      Breath sounds: Normal breath sounds.   Musculoskeletal:      Cervical back: Normal range of motion.   Skin:     General: Skin is warm and dry.   Neurological:       I did order for a Pulmonary consult.  However, we can start with PFTs and go from there, depending on the results.  Also, I would like her to have a chest x-ray.  Order is entered.   no

## (undated) DEVICE — SYR ASEPTO

## (undated) DEVICE — FRAZIER SUCTION TIP 8FR

## (undated) DEVICE — STAPLER SKIN PROXIMATE

## (undated) DEVICE — CANISTER SPECIMEN CONVERTOR PLASTIC

## (undated) DEVICE — SUT PLEDGET SOFT MEDIUM 1/4" X 1/8" X 1/16" X6

## (undated) DEVICE — SYR BULB 2OZ (BLUE)

## (undated) DEVICE — SUT SILK 4-0 18" TIES

## (undated) DEVICE — SUT PROLENE 6-0 30" RB-2

## (undated) DEVICE — VENODYNE/SCD SLEEVE CALF MEDIUM

## (undated) DEVICE — GLV 7 DURAPRENE

## (undated) DEVICE — PACK UPPER BODY

## (undated) DEVICE — SUT HEWSON RETRIEVER

## (undated) DEVICE — SUT PROLENE 7-0 30" BV-1

## (undated) DEVICE — RUBBERBAND STRL LTX FR 200/CA 3X1/8IN

## (undated) DEVICE — SUT VICRYL 2-0 27" CT-1 UNDYED

## (undated) DEVICE — DRSG TEGADERM 4X10"

## (undated) DEVICE — GLV 7.5 PROTEXIS (WHITE)

## (undated) DEVICE — MARKING PEN W RULER

## (undated) DEVICE — GOWN ROYAL SILK XL

## (undated) DEVICE — SUT SILK 2-0 18" SH (POP-OFF)

## (undated) DEVICE — POSITIONER FOAM HEAD DONUT 9" (PINK)

## (undated) DEVICE — URETERAL CATH RED RUBBER 14FR (GREEN)

## (undated) DEVICE — ELCTR BOVIE TIP BLADE INSULATED 2.75" EDGE

## (undated) DEVICE — PROBE PRASS SLIM MONOPOLAR STIMULATOR

## (undated) DEVICE — POSITIONER FOAM EGG CRATE ULNAR 2PCS (PINK)

## (undated) DEVICE — SUT SILK 5-0 18" P-3

## (undated) DEVICE — SUT SILK 2-0 30" SH

## (undated) DEVICE — DRAPE SURGICAL #1010

## (undated) DEVICE — SUCTION YANKAUER BULBOUS TIP W VENT

## (undated) DEVICE — SUT VICRYL 3-0 27" SH

## (undated) DEVICE — PACK VASCULAR MINOR

## (undated) DEVICE — DRSG TEGADERM 2.5X3"

## (undated) DEVICE — DRSG CURITY GAUZE SPONGE 4 X 4" 12-PLY NON-STERILE

## (undated) DEVICE — SUT SILK 2-0 30" PSL

## (undated) DEVICE — ELCTR GROUNDING PAD ADULT COVIDIEN

## (undated) DEVICE — DRAPE MAGNETIC INSTRUMENT MEDIUM

## (undated) DEVICE — GLV 6.5 PROTEXIS (WHITE)

## (undated) DEVICE — FOLEY TRAY 16FR 5CC LF UMETER CLOSED

## (undated) DEVICE — ELCTR NDL SUBDERMAL 2 CHANNEL

## (undated) DEVICE — SUT VICRYL 2-0 27" SH

## (undated) DEVICE — SYR LUER LOK 30CC

## (undated) DEVICE — WARMING BLANKET LOWER ADULT

## (undated) DEVICE — SUT MONOCRYL 4-0 18" PS-2

## (undated) DEVICE — PREP BETADINE KIT

## (undated) DEVICE — SUT PROLENE 5-0 36" RB-1

## (undated) DEVICE — ELCTR MONOPOLAR STIMULATOR PROBE FLUSH-TIP

## (undated) DEVICE — DRSG TEGADERM 4X4.75"

## (undated) DEVICE — DRAIN SILICONE ROUND 9FR

## (undated) DEVICE — WARMING BLANKET UPPER ADULT

## (undated) DEVICE — DRAPE IOBAN 13" X 13"

## (undated) DEVICE — KNIFE ALCON CRESCENT STRAIGHT BEVEL UP 2.3MM (PINK)

## (undated) DEVICE — DRAPE TOWEL BLUE 17" X 24"

## (undated) DEVICE — TOURNIQUET ARGYLE  VASCULAR KIT

## (undated) DEVICE — DRSG MASTISOL

## (undated) DEVICE — SUT VICRYL 3-0 18" SH (POP-OFF)

## (undated) DEVICE — SUT SILK 2-0 12-18"

## (undated) DEVICE — GLV 7 PROTEXIS (WHITE)

## (undated) DEVICE — ELCTR STRYKER NEPTUNE SMOKE EVACUATION PENCIL (GREEN)

## (undated) DEVICE — CATH IV OPTIVA 16G X 2"